# Patient Record
Sex: FEMALE | Race: WHITE | Employment: FULL TIME | URBAN - METROPOLITAN AREA
[De-identification: names, ages, dates, MRNs, and addresses within clinical notes are randomized per-mention and may not be internally consistent; named-entity substitution may affect disease eponyms.]

---

## 2017-04-06 ENCOUNTER — OFFICE VISIT (OUTPATIENT)
Dept: PODIATRY | Facility: CLINIC | Age: 82
End: 2017-04-06
Payer: COMMERCIAL

## 2017-04-06 VITALS — HEIGHT: 62 IN | BODY MASS INDEX: 20.43 KG/M2 | WEIGHT: 111 LBS | HEART RATE: 82 BPM

## 2017-04-06 DIAGNOSIS — B35.1 ONYCHOMYCOSIS: ICD-10-CM

## 2017-04-06 DIAGNOSIS — L85.3 DRY SKIN: ICD-10-CM

## 2017-04-06 DIAGNOSIS — L60.8 NAIL DEFORMITY: Primary | ICD-10-CM

## 2017-04-06 PROCEDURE — 11721 DEBRIDE NAIL 6 OR MORE: CPT | Performed by: PODIATRIST

## 2017-04-06 PROCEDURE — 99213 OFFICE O/P EST LOW 20 MIN: CPT | Mod: 25 | Performed by: PODIATRIST

## 2017-04-06 RX ORDER — AMMONIUM LACTATE 12 G/100G
CREAM TOPICAL 2 TIMES DAILY PRN
Qty: 385 G | Refills: 3 | Status: SHIPPED | OUTPATIENT
Start: 2017-04-06

## 2017-04-06 NOTE — PATIENT INSTRUCTIONS
DR. LUZ'S SCHEDULE:        Monday & Friday AM - [facility] Wednesday - [facility]   [address] [place] [address] [place] [address]   P: [phone] P: [phone]   F: [phone] F:[phone]       Tuesday - Surgery Thursday - [facility]   [phone] [address] [place] [address]   Appointment Scheduling Line: P: [phone] F: [phone]     FYI: Our new schedule at [facility] on Wednesday is from 7 AM - 2 PM.    FOOT CARE NURSES  If you are interested in having a foot care nurse come out to your   home, please call one of these contacts for more information:  [facility]  [phone] [facility]  [phone]   [facility]  [phone]  [facility] [phone]  [place]   [facility]  [phone]  At [facility] [phone]           Body Mass Index (BMI)    Many things can cause foot and ankle problems. Foot structure, activity level, foot mechanics and injuries are common causes of pain.    One very important issue that often goes unmentioned, is body weight.  Extra weight can cause increased stress on muscles, ligaments, bones and tendons. Sometimes just a few extra pounds is all it takes to put one over her/his threshold. Without reducing that stress, it can be difficult to alleviate pain.      Some people are uncomfortable addressing this issue, but we feel it is important for you to think about it. As Foot & Ankle specialists, our job is addressing the lower extremity problem and possible causes.     Regarding extra body weight, we encourage patients to discuss diet and weight management plans with their primary care doctors. It is this team approach that gives you the best opportunity for pain relief and getting you back on your feet.

## 2017-04-06 NOTE — PROGRESS NOTES
"ASSESSMENT/PLAN:    Encounter Diagnoses   Name Primary?     Nail deformity, severe, x 10 Yes     Onychomycosis      Dry skin      Using a sterile nail nippers, debridement of 10 nails was performed.  Nails were shortened.  The thickest nails were cut in a fasion to skive off some of the bulk.  Subungual debris was cleared away where needed.      Lac Hydrin 12% cream prescribed for dry skin     Follow up as needed.    Body mass index is 20.3 kg/(m^2).          Sudarshan Couch DPM, FACFAS, MS    [place] Department of Podiatry/Foot & Ankle Surgery      ____________________________________________________________________    HPI:         Chief Complaint: \"nails growing into skin\"  Onset of problem:  1 year  Pain/ discomfort is described as:  shooting  Ratin/10   Frequency:  \"most of the time\"    The pain is made worse with shoes  Previous treatment: nail debridement      *  Past Medical History:   Diagnosis Date     Hyperlipidemia LDL goal <100 2014     Hypertension goal BP (blood pressure) < 140/90 2014     Personal history of malignant neoplasm of large intestine (COLONIC) 2014   *  *No past surgical history on file.*  *  Current Outpatient Prescriptions   Medication Sig Dispense Refill     hydrochlorothiazide (HYDRODIURIL) 25 MG tablet Take 1 tablet (25 mg) by mouth daily 30 tablet      metoprolol (TOPROL-XL) 25 MG 24 hr tablet Take 1 tablet (25 mg) by mouth daily 30 tablet 1     simvastatin (ZOCOR) 40 MG tablet Take 1 tablet (40 mg) by mouth At Bedtime 90 tablet 3     fluticasone (FLONASE) 50 MCG/ACT nasal spray Spray 2 sprays into both nostrils daily 1 Package 0       ROS:    A 10-point review of systems was performed.  It is positive for that noted in the HPI and as seen below.  All other systems found to be negative.     Numbness in feet?  no   Calf pain with walking? no  Recent foot/ankle injury? No  Weight change  over past 12 months? 30# loss  Self perception as overweight? no  Recent flu-like " symptoms? no  Joint pain other than feet ? Right hip    EXAM:    Vitals: There were no vitals taken for this visit.  BMI: There is no height or weight on file to calculate BMI.  Height: Data Unavailable    Constitutional/ general:  Pt is in no apparent distress, appears well-nourished.  Cooperative with history and physical exam.     Vascular:  Pedal pulses are palpable bilaterally for both the DP and PT arteries.  CFT < 3 sec.  No edema.  Pedal hair growth noted.     Neuro:  Alert and oriented x 3. Coordinated gait.  Light touch sensation is intact to the L4, L5, S1 distributions. No obvious deficits.  No evidence of neurological-based weakness, spasticity, or contracture in the lower extremities.   Protective sensation is intact per [place]-Jg Monofilament testing.    Derm: Normal texture and turgor.  No erythema, ecchymosis, or cyanosis.  No open lesions. Generalized dryness and flaking.     Nails are thickened, deformed, discolored and elongated. There is subungual debris. Several are so long that they are gryphotic and digging into the skin or other toes.    Musculoskeletal:    Lower extremity muscle strength is normal. Patient is ambulatory without an assistive device or brace . Digital contractures. Hallux abducto valgus right foot foot. The deformity is not fully reducible in the transverse plane - there is tracking.       Sudarshan Couch DPM, FACUMM, MS    [place] Department of Podiatry/Foot & Ankle Surgery

## 2017-04-06 NOTE — NURSING NOTE
"Chief Complaint   Patient presents with     RECHECK     nails are thick and growing into the skin       Initial Pulse 82  Ht 5' 2\" (1.575 m)  Wt 111 lb (50.3 kg)  BMI 20.3 kg/m2 Estimated body mass index is 20.3 kg/(m^2) as calculated from the following:    Height as of this encounter: 5' 2\" (1.575 m).    Weight as of this encounter: 111 lb (50.3 kg).  Medication Reconciliation: complete  "

## 2017-04-06 NOTE — LETTER
"  2017       RE: Ana Kumar  [address]  [facility] [address]           Dear Colleague,    Thank you for referring your patient, Ana Kumar, to the [facility]. Please see a copy of my visit note below.    ASSESSMENT/PLAN:    Encounter Diagnoses   Name Primary?     Nail deformity, severe, x 10 Yes     Onychomycosis      Dry skin      Using a sterile nail nippers, debridement of 10 nails was performed.  Nails were shortened.  The thickest nails were cut in a fasion to skive off some of the bulk.  Subungual debris was cleared away where needed.      Lac Hydrin 12% cream prescribed for dry skin     Follow up as needed.    Body mass index is 20.3 kg/(m^2).          Sudarshan Couch DPM, FACFAS, MS    [place] Department of Podiatry/Foot & Ankle Surgery      ____________________________________________________________________    HPI:         Chief Complaint: \"nails growing into skin\"  Onset of problem:  1 year  Pain/ discomfort is described as:  shooting  Ratin/10   Frequency:  \"most of the time\"    The pain is made worse with shoes  Previous treatment: nail debridement      *  Past Medical History:   Diagnosis Date     Hyperlipidemia LDL goal <100 2014     Hypertension goal BP (blood pressure) < 140/90 2014     Personal history of malignant neoplasm of large intestine (COLONIC) 2014   *  *No past surgical history on file.*  *  Current Outpatient Prescriptions   Medication Sig Dispense Refill     hydrochlorothiazide (HYDRODIURIL) 25 MG tablet Take 1 tablet (25 mg) by mouth daily 30 tablet      metoprolol (TOPROL-XL) 25 MG 24 hr tablet Take 1 tablet (25 mg) by mouth daily 30 tablet 1     simvastatin (ZOCOR) 40 MG tablet Take 1 tablet (40 mg) by mouth At Bedtime 90 tablet 3     fluticasone (FLONASE) 50 MCG/ACT nasal spray Spray 2 sprays into both nostrils daily 1 Package 0       ROS:    A 10-point review of systems was performed.  It is positive for that noted in the " HPI and as seen below.  All other systems found to be negative.     Numbness in feet?  no   Calf pain with walking? no  Recent foot/ankle injury? No  Weight change  over past 12 months? 30# loss  Self perception as overweight? no  Recent flu-like symptoms? no  Joint pain other than feet ? Right hip    EXAM:    Vitals: There were no vitals taken for this visit.  BMI: There is no height or weight on file to calculate BMI.  Height: Data Unavailable    Constitutional/ general:  Pt is in no apparent distress, appears well-nourished.  Cooperative with history and physical exam.     Vascular:  Pedal pulses are palpable bilaterally for both the DP and PT arteries.  CFT < 3 sec.  No edema.  Pedal hair growth noted.     Neuro:  Alert and oriented x 3. Coordinated gait.  Light touch sensation is intact to the L4, L5, S1 distributions. No obvious deficits.  No evidence of neurological-based weakness, spasticity, or contracture in the lower extremities.   Protective sensation is intact per [place]-Jg Monofilament testing.    Derm: Normal texture and turgor.  No erythema, ecchymosis, or cyanosis.  No open lesions. Generalized dryness and flaking.     Nails are thickened, deformed, discolored and elongated. There is subungual debris. Several are so long that they are gryphotic and digging into the skin or other toes.    Musculoskeletal:    Lower extremity muscle strength is normal. Patient is ambulatory without an assistive device or brace . Digital contractures. Hallux abducto valgus right foot foot. The deformity is not fully reducible in the transverse plane - there is tracking.       Sudarshan Couch DPM, FACFAS, MS    [place] Department of Podiatry/Foot & Ankle Surgery              Again, thank you for allowing me to participate in the care of your patient.        Sincerely,              Sudarshan Couch DPM

## 2017-04-06 NOTE — MR AVS SNAPSHOT
After Visit Summary   2017    Ana Kumar    MRN: [id number]           Patient Information     Date Of Birth          10/27/1928        Visit Information        Provider Department      2017 10:00 AM Sudarshan Luz DPM [facility]        Care Instructions    DR. LUZ'S SCHEDULE:        Monday & Friday AM - [facility] Wednesday - [facility]   [address] [place] [address] [place] [address]   P: [phone] P: [phone]   F: [phone] F:[phone]       Tuesday - Surgery Thursday - [facility]   [phone] [address] [place] [address]   Appointment Scheduling Line: P: [phone] F: [phone]     FYI: Our new schedule at [facility] on Wednesday is from 7 AM - 2 PM.    FOOT CARE NURSES  If you are interested in having a foot care nurse come out to your   home, please call one of these contacts for more information:  [facility]  [phone] [facility]  [phone]   [facility]  [phone]  [facility] [phone]  [place]   [facility]  [phone]  At [facility] [phone]           Body Mass Index (BMI)    Many things can cause foot and ankle problems. Foot structure, activity level, foot mechanics and injuries are common causes of pain.    One very important issue that often goes unmentioned, is body weight.  Extra weight can cause increased stress on muscles, ligaments, bones and tendons. Sometimes just a few extra pounds is all it takes to put one over her/his threshold. Without reducing that stress, it can be difficult to alleviate pain.      Some people are uncomfortable addressing this issue, but we feel it is important for you to think about it. As Foot & Ankle specialists, our job is addressing the lower extremity problem and possible causes.     Regarding extra body  "weight, we encourage patients to discuss diet and weight management plans with their primary care doctors. It is this team approach that gives you the best opportunity for pain relief and getting you back on your feet.              Follow-ups after your visit        Who to contact     If you have questions or need follow up information about today's clinic visit or your schedule please contact [facility] directly at [phone].  Normal or non-critical lab and imaging results will be communicated to you by [employer], letter or phone within 4 business days after the clinic has received the results. If you do not hear from us within 7 days, please contact the clinic through [employer] or phone. If you have a critical or abnormal lab result, we will notify you by phone as soon as possible.  Submit refill requests through [employer] or call your pharmacy and they will forward the refill request to us. Please allow 3 business days for your refill to be completed.          Additional Information About Your Visit        [employer] Information     [employer] lets you send messages to your doctor, view your test results, renew your prescriptions, schedule appointments and more. To sign up, go to [email or web address] . Click on \"Log in\" on the left side of the screen, which will take you to the Welcome page. Then click on \"Sign up Now\" on the right side of the page.     You will be asked to enter the access code listed below, as well as some personal information. Please follow the directions to create your username and password.     Your access code is: [email or web address]  Expires: 2017 10:11 AM     Your access code will  in 90 days. If you need help or a new code, please call your [facility] clinic or [phone].        Care EveryWhere ID     This is your Care EveryWhere ID. This could be used by other organizations to access your [facility] medical records  [id number]        Your Vitals Were     Pulse Height BMI " "(Body Mass Index)             82 5' 2\" (1.575 m) 20.3 kg/m2          Blood Pressure from Last 3 Encounters:   01/06/16 128/76   01/05/16 130/70   12/28/15 126/80    Weight from Last 3 Encounters:   04/06/17 111 lb (50.3 kg)   01/25/16 111 lb (50.3 kg)   01/06/16 111 lb (50.3 kg)              Today, you had the following     No orders found for display       Primary Care Provider Office Phone # Fax #    Chas Kyle Vo [phone]       [address] [address]        Thank you!     Thank you for choosing [facility]  for your care. Our goal is always to provide you with excellent care. Hearing back from our patients is one way we can continue to improve our services. Please take a few minutes to complete the written survey that you may receive in the mail after your visit with us. Thank you!             Your Updated Medication List - Protect others around you: Learn how to safely use, store and throw away your medicines at www.disposemymeds.org.          This list is accurate as of: 4/6/17 10:11 AM.  Always use your most recent med list.                   Brand Name Dispense Instructions for use    fluticasone 50 MCG/ACT spray    FLONASE    1 Package    Spray 2 sprays into both nostrils daily       hydrochlorothiazide 25 MG tablet    HYDRODIURIL    30 tablet    Take 1 tablet (25 mg) by mouth daily       metoprolol 25 MG 24 hr tablet    TOPROL-XL    30 tablet    Take 1 tablet (25 mg) by mouth daily       simvastatin 40 MG tablet    ZOCOR    90 tablet    Take 1 tablet (40 mg) by mouth At Bedtime         "

## 2018-11-06 ENCOUNTER — ALLIED HEALTH/NURSE VISIT (OUTPATIENT)
Dept: NURSING | Facility: CLINIC | Age: 83
End: 2018-11-06
Payer: COMMERCIAL

## 2018-11-06 DIAGNOSIS — Z23 NEED FOR PROPHYLACTIC VACCINATION AND INOCULATION AGAINST INFLUENZA: Primary | ICD-10-CM

## 2018-11-06 PROCEDURE — 90662 IIV NO PRSV INCREASED AG IM: CPT

## 2018-11-06 PROCEDURE — G0008 ADMIN INFLUENZA VIRUS VAC: HCPCS

## 2018-11-06 NOTE — MR AVS SNAPSHOT
After Visit Summary   11/6/2018    Ana Kumar    MRN: [id number]           Patient Information     Date Of Birth          10/27/1928        Visit Information        Provider Department      11/6/2018 12:45 PM  FLU CLINIC NURSE [facility]        Today's Diagnoses     Need for prophylactic vaccination and inoculation against influenza    -  1       Follow-ups after your visit        Who to contact     If you have questions or need follow up information about today's clinic visit or your schedule please contact [facility] directly at [phone].  Normal or non-critical lab and imaging results will be communicated to you by MyChart, letter or phone within 4 business days after the clinic has received the results. If you do not hear from us within 7 days, please contact the clinic through MyChart or phone. If you have a critical or abnormal lab result, we will notify you by phone as soon as possible.  Submit refill requests through [employer] or call your pharmacy and they will forward the refill request to us. Please allow 3 business days for your refill to be completed.          Additional Information About Your Visit        Care EveryWhere ID     This is your Care EveryWhere ID. This could be used by other organizations to access your [place] medical records  [id number]         Blood Pressure from Last 3 Encounters:   01/06/16 128/76   01/05/16 130/70   12/28/15 126/80    Weight from Last 3 Encounters:   04/06/17 50.3 kg (111 lb)   01/25/16 50.3 kg (111 lb)   01/06/16 50.3 kg (111 lb)              We Performed the Following     FLU VACCINE, INCREASED ANTIGEN, PRESV FREE, AGE 65+ [10112]     Vaccine Administration, Initial [90120]        Primary Care Provider Office Phone # Fax #    Chas Kyle Vo [phone] [address] [place] [address]        Equal Access to Services     CEE ESCALONA AH: Leatha Kapadia,  wapetrda ashoknishant, qaybta kaaudra lewis, gurpreet diggsaaliu ah. So [facility] [phone].    ATENCIÓN: Si kassandra tripp, tiene a borja disposición servicios gratuitos de asistencia lingüística. Marisela al [phone].    We comply with applicable federal civil rights laws and [place] laws. We do not discriminate on the basis of race, color, national origin, age, disability, sex, sexual orientation, or gender identity.            Thank you!     Thank you for choosing [facility]  for your care. Our goal is always to provide you with excellent care. Hearing back from our patients is one way we can continue to improve our services. Please take a few minutes to complete the written survey that you may receive in the mail after your visit with us. Thank you!             Your Updated Medication List - Protect others around you: Learn how to safely use, store and throw away your medicines at www.disposemymeds.org.          This list is accurate as of 11/6/18 12:55 PM.  Always use your most recent med list.                   Brand Name Dispense Instructions for use Diagnosis    ammonium lactate 12 % cream    LAC-HYDRIN    385 g    Apply topically 2 times daily as needed for dry skin    Dry skin       fluticasone 50 MCG/ACT spray    FLONASE    1 Package    Spray 2 sprays into both nostrils daily    Acute pharyngitis       hydrochlorothiazide 25 MG tablet    HYDRODIURIL    30 tablet    Take 1 tablet (25 mg) by mouth daily        metoprolol succinate 25 MG 24 hr tablet    TOPROL-XL    30 tablet    Take 1 tablet (25 mg) by mouth daily    Hypertension goal BP (blood pressure) < 140/90       simvastatin 40 MG tablet    ZOCOR    90 tablet    Take 1 tablet (40 mg) by mouth At Bedtime

## 2018-11-06 NOTE — PROGRESS NOTES

## 2019-01-21 ENCOUNTER — TELEPHONE (OUTPATIENT)
Dept: FAMILY MEDICINE | Facility: CLINIC | Age: 84
End: 2019-01-21

## 2019-01-21 NOTE — TELEPHONE ENCOUNTER
"Pt called clinic asking about meds possibly sent by [facility] Patient said she was seen by [facility] last week.    She wanted to get these meds \"before she dies.\"    I am not able to see meds rx by another company, like [employer]. I directed pt to call the [facility] where she was seen.  Pt agreed with POC.  KISHAN Keith    "

## 2019-03-07 ENCOUNTER — OFFICE VISIT (OUTPATIENT)
Dept: PODIATRY | Facility: CLINIC | Age: 84
End: 2019-03-07
Payer: MEDICARE

## 2019-03-07 VITALS
BODY MASS INDEX: 17.11 KG/M2 | HEIGHT: 62 IN | WEIGHT: 93 LBS | DIASTOLIC BLOOD PRESSURE: 68 MMHG | SYSTOLIC BLOOD PRESSURE: 102 MMHG

## 2019-03-07 DIAGNOSIS — B35.1 ONYCHOMYCOSIS: ICD-10-CM

## 2019-03-07 DIAGNOSIS — R60.0 EDEMA OF BOTH FEET: ICD-10-CM

## 2019-03-07 DIAGNOSIS — M79.674 PAIN IN TOES OF BOTH FEET: Primary | ICD-10-CM

## 2019-03-07 DIAGNOSIS — L60.8 NAIL DEFORMITY: ICD-10-CM

## 2019-03-07 DIAGNOSIS — M79.675 PAIN IN TOES OF BOTH FEET: Primary | ICD-10-CM

## 2019-03-07 PROCEDURE — 11721 DEBRIDE NAIL 6 OR MORE: CPT | Mod: GA | Performed by: PODIATRIST

## 2019-03-07 PROCEDURE — 99212 OFFICE O/P EST SF 10 MIN: CPT | Mod: 25 | Performed by: PODIATRIST

## 2019-03-07 ASSESSMENT — MIFFLIN-ST. JEOR: SCORE: 795.1

## 2019-03-07 NOTE — PROGRESS NOTES
Encounter Diagnoses   Name Primary?     Pain in toes of both feet Yes     Nail deformity      Onychomycosis      Edema of both feet      It was explained that [facility] does not do routine foot care involving nail trimming.  We have discussed this in the past, but I make an exception for her. This is due to her age and the degree of nail deformity.  I do not think that a foot care nurse would be comfortable debriding her nails. T It was explained that signing of an ABN waiver is needed for all Medicare patient's and that out of pocket payment is likely. She had no issues with this.    Using a sterile nail nippers, debridement of 10 nails was performed.  Nails were shortened.  The thickest nails were cut in a fasion to skive off some of the bulk.  Subungual debris was cleared away where needed.      There was bleeding located at distal left hallux. The area was cleansed with an alcohol wipe.  Bacitracin and bandaid applied.      Pt to monitor and call clinic if increasing rendess, pain, and swelling.    I do not think her bilateral foot edema is stemming from the foot. She is to monitor.  If it persists or worsens, I recommended that she follow up with her PCP.    Sudarshan Couch, BRENDEN, FACFAS, MS    [place] Department of Podiatry/Foot & Ankle Surgery      ____________________________________________________________________    HPI:         Chief Complaint: bilateral foot pain related to toenail deformities.   Onset of problem: weeks of pain  Pain/ discomfort is described as:  Deep ache, throbbing  Ratin/10 at worst   Due to the degree of deformity, she is unable to care for her nails.   She also reports noticing more swelling in both of her feet.     Patient Active Problem List   Diagnosis     Hyperlipidemia LDL goal <100     Hypertension goal BP (blood pressure) < 140/90     History of malignant neoplasm of large intestine     Seasonal allergic rhinitis     Hip pain, right      "Abnormal gait     No past surgical history on file.  Current Outpatient Medications   Medication Sig Dispense Refill     ammonium lactate (LAC-HYDRIN) 12 % cream Apply topically 2 times daily as needed for dry skin 385 g 3     fluticasone (FLONASE) 50 MCG/ACT nasal spray Spray 2 sprays into both nostrils daily 1 Package 0     hydrochlorothiazide (HYDRODIURIL) 25 MG tablet Take 1 tablet (25 mg) by mouth daily 30 tablet      metoprolol (TOPROL-XL) 25 MG 24 hr tablet Take 1 tablet (25 mg) by mouth daily 30 tablet 1     simvastatin (ZOCOR) 40 MG tablet Take 1 tablet (40 mg) by mouth At Bedtime 90 tablet 3       EXAM:    Vitals: /68   Ht 1.575 m (5' 2\")   Wt 42.2 kg (93 lb)   BMI 17.01 kg/m    BMI: Body mass index is 17.01 kg/m .  Height: 5' 2\"    Constitutional/ general:  Pt is in no apparent distress, appears well-nourished.  Cooperative with history and physical exam.      Vascular:  Pedal pulses are palpable bilaterally for both the DP and PT arteries.  CFT < 3 sec.  No edema.  Pedal hair growth noted.      Neuro:  Alert and oriented x 3. Coordinated gait.  Light touch sensation is intact to the L4, L5, S1 distributions. No obvious deficits.  No evidence of neurological-based weakness, spasticity, or contracture in the lower extremities.   Protective sensation is intact per [place]-Jg Monofilament testing.     Derm: Normal texture and turgor.  No erythema, ecchymosis, or cyanosis.  No open lesions. Generalized dryness and flaking.      Nails are severely thickened, deformed, discolored and elongated. There is subungual debris. Several are so long that they are gryphotic and digging into the skin or other toes.     Musculoskeletal:    Lower extremity muscle strength is normal. Patient is ambulatory without an assistive device or brace . Digital contractures. Hallux abducto valgus right foot foot. The deformity is not fully reducible in the transverse plane - there is tracking.       Sudarshan Couch, DPM, " [place] [facility] Department of Podiatry/Foot & Ankle Surgery

## 2019-03-07 NOTE — PATIENT INSTRUCTIONS
Thank you for choosing [facility]!    DR. LUZ'S CLINIC LOCATIONS     MONDAY - [place] WEDNESDAY - [place]   [address] [place] [address] [place], [place] [id number] [phone] / [phone] / [phone]       THURSDAY - [place] SCHEDULE SURGERY: [phone]   [address] APPOINTMENTS: [phone]   [place] [address] BILLING QUESTIONS: [phone] / [phone]       FOOT CARE NURSES    [facility]  Светлана Garcia  [phone]  (Travels to your home) [facility]  [phone]  (Travels to your home)   Denis Tobin DPM  [address] [place] [address] [phone] [facility]  [phone]  (Travels to your home)   [facility]  [address], [place], [place] [id number] [phone] [facility]  [phone]  [place] [facility] [place] [facility]   Destiny Marquez, VIVIANM  [address] [place], [place] [address] [phone] [facility]   [phone]  [facility]   [facility]  [phone]  At [facility]  (does not take BCBS) [facility]   [phone]                 FYI: BODY MASS INDEX (BMI)  Many things can cause foot and ankle problems. Foot structure, activity level, foot mechanics and injuries are common causes of pain. One very important issue that often goes unmentioned, is body weight. Extra weight can cause increased stress on muscles, ligaments, bones and tendons. Sometimes just a few extra pounds is all it takes to put one over her/his threshold. Without reducing that stress, it can be difficult to alleviate pain. Some people are uncomfortable addressing this issue, but we feel it is important for you to think about it. As Foot & Ankle specialists, our job is addressing the lower extremity problem and possible causes. Regarding extra body weight, we encourage patients to discuss diet and weight management plans with  their primary care doctors. It is this team approach that gives you the best opportunity for pain relief and getting you back on your feet.

## 2019-03-07 NOTE — LETTER
3/7/2019         RE: Ana Kumar  [address]  [facility] [address]        Dear Colleague,    Thank you for referring your patient, Ana Kumar, to the [facility]. Please see a copy of my visit note below.    Encounter Diagnoses   Name Primary?     Pain in toes of both feet Yes     Nail deformity      Onychomycosis      Edema of both feet      It was explained that [facility] does not do routine foot care involving nail trimming.  We have discussed this in the past, but I make an exception for her. This is due to her age and the degree of nail deformity.  I do not think that a foot care nurse would be comfortable debriding her nails. T It was explained that signing of an ABN waiver is needed for all Medicare patient's and that out of pocket payment is likely. She had no issues with this.    Using a sterile nail nippers, debridement of 10 nails was performed.  Nails were shortened.  The thickest nails were cut in a fasion to skive off some of the bulk.  Subungual debris was cleared away where needed.      There was bleeding located at distal left hallux. The area was cleansed with an alcohol wipe.  Bacitracin and bandaid applied.      Pt to monitor and call clinic if increasing rendess, pain, and swelling.    I do not think her bilateral foot edema is stemming from the foot. She is to monitor.  If it persists or worsens, I recommended that she follow up with her PCP.    Sudarshan Couch, BRENDEN, FACFAS, MS    [facility]      ____________________________________________________________________    HPI:         Chief Complaint: bilateral foot pain related to toenail deformities.   Onset of problem: weeks of pain  Pain/ discomfort is described as:  Deep ache, throbbing  Ratin/10 at worst   Due to the degree of deformity, she is unable to care for her nails.   She also reports noticing more swelling in both of her  "feet.     Patient Active Problem List   Diagnosis     Hyperlipidemia LDL goal <100     Hypertension goal BP (blood pressure) < 140/90     History of malignant neoplasm of large intestine     Seasonal allergic rhinitis     Hip pain, right     Abnormal gait     No past surgical history on file.  Current Outpatient Medications   Medication Sig Dispense Refill     ammonium lactate (LAC-HYDRIN) 12 % cream Apply topically 2 times daily as needed for dry skin 385 g 3     fluticasone (FLONASE) 50 MCG/ACT nasal spray Spray 2 sprays into both nostrils daily 1 Package 0     hydrochlorothiazide (HYDRODIURIL) 25 MG tablet Take 1 tablet (25 mg) by mouth daily 30 tablet      metoprolol (TOPROL-XL) 25 MG 24 hr tablet Take 1 tablet (25 mg) by mouth daily 30 tablet 1     simvastatin (ZOCOR) 40 MG tablet Take 1 tablet (40 mg) by mouth At Bedtime 90 tablet 3       EXAM:    Vitals: /68   Ht 1.575 m (5' 2\")   Wt 42.2 kg (93 lb)   BMI 17.01 kg/m     BMI: Body mass index is 17.01 kg/m .  Height: 5' 2\"    Constitutional/ general:  Pt is in no apparent distress, appears well-nourished.  Cooperative with history and physical exam.      Vascular:  Pedal pulses are palpable bilaterally for both the DP and PT arteries.  CFT < 3 sec.  No edema.  Pedal hair growth noted.      Neuro:  Alert and oriented x 3. Coordinated gait.  Light touch sensation is intact to the L4, L5, S1 distributions. No obvious deficits.  No evidence of neurological-based weakness, spasticity, or contracture in the lower extremities.   Protective sensation is intact per Clovis-Jg Monofilament testing.     Derm: Normal texture and turgor.  No erythema, ecchymosis, or cyanosis.  No open lesions. Generalized dryness and flaking.      Nails are severely thickened, deformed, discolored and elongated. There is subungual debris. Several are so long that they are gryphotic and digging into the skin or other toes.     Musculoskeletal:    Lower extremity muscle " strength is normal. Patient is ambulatory without an assistive device or brace . Digital contractures. Hallux abducto valgus right foot foot. The deformity is not fully reducible in the transverse plane - there is tracking.       Sudarshan Couch DPM, FACUMM, MS    [facility]              Again, thank you for allowing me to participate in the care of your patient.        Sincerely,        Sudarshan Couch DPM

## 2022-07-05 ENCOUNTER — LAB REQUISITION (OUTPATIENT)
Dept: LAB | Facility: CLINIC | Age: 87
End: 2022-07-05
Payer: MEDICARE

## 2022-07-05 DIAGNOSIS — E87.1 HYPO-OSMOLALITY AND HYPONATREMIA: ICD-10-CM

## 2022-07-06 LAB
ANION GAP SERPL CALCULATED.3IONS-SCNC: 12 MMOL/L (ref 7–15)
BUN SERPL-MCNC: 16.4 MG/DL (ref 8–23)
CALCIUM SERPL-MCNC: 9.2 MG/DL (ref 8.2–9.6)
CHLORIDE SERPL-SCNC: 104 MMOL/L (ref 98–107)
CREAT SERPL-MCNC: 0.92 MG/DL (ref 0.51–0.95)
DEPRECATED HCO3 PLAS-SCNC: 26 MMOL/L (ref 22–29)
GFR SERPL CREATININE-BSD FRML MDRD: 58 ML/MIN/1.73M2
GLUCOSE SERPL-MCNC: 117 MG/DL (ref 70–99)
POTASSIUM SERPL-SCNC: 3.1 MMOL/L (ref 3.4–5.3)
SODIUM SERPL-SCNC: 142 MMOL/L (ref 136–145)

## 2022-07-06 PROCEDURE — 36415 COLL VENOUS BLD VENIPUNCTURE: CPT | Mod: ORL | Performed by: FAMILY MEDICINE

## 2022-07-06 PROCEDURE — P9603 ONE-WAY ALLOW PRORATED MILES: HCPCS | Mod: ORL | Performed by: FAMILY MEDICINE

## 2022-07-06 PROCEDURE — 80048 BASIC METABOLIC PNL TOTAL CA: CPT | Mod: ORL | Performed by: FAMILY MEDICINE

## 2022-08-20 ENCOUNTER — LAB REQUISITION (OUTPATIENT)
Dept: LAB | Facility: CLINIC | Age: 87
End: 2022-08-20
Payer: MEDICARE

## 2022-08-20 DIAGNOSIS — E87.6 HYPOKALEMIA: ICD-10-CM

## 2022-08-20 DIAGNOSIS — I10 ESSENTIAL (PRIMARY) HYPERTENSION: ICD-10-CM

## 2022-08-20 DIAGNOSIS — E87.1 HYPO-OSMOLALITY AND HYPONATREMIA: ICD-10-CM

## 2022-08-24 LAB
ANION GAP SERPL CALCULATED.3IONS-SCNC: 11 MMOL/L (ref 7–15)
BUN SERPL-MCNC: 12.3 MG/DL (ref 8–23)
CALCIUM SERPL-MCNC: 9.7 MG/DL (ref 8.2–9.6)
CHLORIDE SERPL-SCNC: 100 MMOL/L (ref 98–107)
CREAT SERPL-MCNC: 0.98 MG/DL (ref 0.51–0.95)
DEPRECATED HCO3 PLAS-SCNC: 27 MMOL/L (ref 22–29)
GFR SERPL CREATININE-BSD FRML MDRD: 54 ML/MIN/1.73M2
GLUCOSE SERPL-MCNC: 79 MG/DL (ref 70–99)
POTASSIUM SERPL-SCNC: 4.4 MMOL/L (ref 3.4–5.3)
SODIUM SERPL-SCNC: 138 MMOL/L (ref 136–145)

## 2022-08-24 PROCEDURE — 80048 BASIC METABOLIC PNL TOTAL CA: CPT | Mod: ORL | Performed by: FAMILY MEDICINE

## 2022-08-24 PROCEDURE — 36415 COLL VENOUS BLD VENIPUNCTURE: CPT | Mod: ORL | Performed by: FAMILY MEDICINE

## 2022-08-24 PROCEDURE — P9603 ONE-WAY ALLOW PRORATED MILES: HCPCS | Mod: ORL | Performed by: FAMILY MEDICINE

## 2022-12-27 ENCOUNTER — LAB REQUISITION (OUTPATIENT)
Dept: LAB | Facility: CLINIC | Age: 87
End: 2022-12-27
Payer: MEDICARE

## 2022-12-27 DIAGNOSIS — I10 ESSENTIAL (PRIMARY) HYPERTENSION: ICD-10-CM

## 2022-12-28 LAB
ANION GAP SERPL CALCULATED.3IONS-SCNC: 20 MMOL/L (ref 7–15)
BUN SERPL-MCNC: 12.2 MG/DL (ref 8–23)
CALCIUM SERPL-MCNC: 9.2 MG/DL (ref 8.2–9.6)
CHLORIDE SERPL-SCNC: 94 MMOL/L (ref 98–107)
CREAT SERPL-MCNC: 0.82 MG/DL (ref 0.51–0.95)
DEPRECATED HCO3 PLAS-SCNC: 20 MMOL/L (ref 22–29)
ERYTHROCYTE [DISTWIDTH] IN BLOOD BY AUTOMATED COUNT: 12.6 % (ref 10–15)
GFR SERPL CREATININE-BSD FRML MDRD: 66 ML/MIN/1.73M2
GLUCOSE SERPL-MCNC: 175 MG/DL (ref 70–99)
HCT VFR BLD AUTO: 36.2 % (ref 35–47)
HGB BLD-MCNC: 11.7 G/DL (ref 11.7–15.7)
MCH RBC QN AUTO: 32.3 PG (ref 26.5–33)
MCHC RBC AUTO-ENTMCNC: 32.3 G/DL (ref 31.5–36.5)
MCV RBC AUTO: 100 FL (ref 78–100)
PLATELET # BLD AUTO: 276 10E3/UL (ref 150–450)
POTASSIUM SERPL-SCNC: 4 MMOL/L (ref 3.4–5.3)
RBC # BLD AUTO: 3.62 10E6/UL (ref 3.8–5.2)
SODIUM SERPL-SCNC: 134 MMOL/L (ref 136–145)
WBC # BLD AUTO: 7.2 10E3/UL (ref 4–11)

## 2022-12-28 PROCEDURE — P9603 ONE-WAY ALLOW PRORATED MILES: HCPCS | Mod: ORL | Performed by: FAMILY MEDICINE

## 2022-12-28 PROCEDURE — 85027 COMPLETE CBC AUTOMATED: CPT | Mod: ORL | Performed by: FAMILY MEDICINE

## 2022-12-28 PROCEDURE — 36415 COLL VENOUS BLD VENIPUNCTURE: CPT | Mod: ORL | Performed by: FAMILY MEDICINE

## 2022-12-28 PROCEDURE — 80048 BASIC METABOLIC PNL TOTAL CA: CPT | Mod: ORL | Performed by: FAMILY MEDICINE

## 2023-07-31 ENCOUNTER — LAB REQUISITION (OUTPATIENT)
Dept: LAB | Facility: CLINIC | Age: 88
End: 2023-07-31
Payer: MEDICARE

## 2023-07-31 DIAGNOSIS — I10 ESSENTIAL (PRIMARY) HYPERTENSION: ICD-10-CM

## 2023-08-02 LAB
ANION GAP SERPL CALCULATED.3IONS-SCNC: 12 MMOL/L (ref 7–15)
BASOPHILS # BLD AUTO: 0 10E3/UL (ref 0–0.2)
BASOPHILS NFR BLD AUTO: 0 %
BUN SERPL-MCNC: 20.7 MG/DL (ref 8–23)
CALCIUM SERPL-MCNC: 9.2 MG/DL (ref 8.2–9.6)
CHLORIDE SERPL-SCNC: 101 MMOL/L (ref 98–107)
CREAT SERPL-MCNC: 0.81 MG/DL (ref 0.51–0.95)
DEPRECATED HCO3 PLAS-SCNC: 26 MMOL/L (ref 22–29)
EOSINOPHIL # BLD AUTO: 0.1 10E3/UL (ref 0–0.7)
EOSINOPHIL NFR BLD AUTO: 2 %
ERYTHROCYTE [DISTWIDTH] IN BLOOD BY AUTOMATED COUNT: 12.7 % (ref 10–15)
GFR SERPL CREATININE-BSD FRML MDRD: 67 ML/MIN/1.73M2
GLUCOSE SERPL-MCNC: 162 MG/DL (ref 70–99)
HCT VFR BLD AUTO: 36.5 % (ref 35–47)
HGB BLD-MCNC: 11.9 G/DL (ref 11.7–15.7)
IMM GRANULOCYTES # BLD: 0 10E3/UL
IMM GRANULOCYTES NFR BLD: 0 %
LYMPHOCYTES # BLD AUTO: 1 10E3/UL (ref 0.8–5.3)
LYMPHOCYTES NFR BLD AUTO: 17 %
MCH RBC QN AUTO: 33.4 PG (ref 26.5–33)
MCHC RBC AUTO-ENTMCNC: 32.6 G/DL (ref 31.5–36.5)
MCV RBC AUTO: 103 FL (ref 78–100)
MONOCYTES # BLD AUTO: 0.4 10E3/UL (ref 0–1.3)
MONOCYTES NFR BLD AUTO: 7 %
NEUTROPHILS # BLD AUTO: 4.4 10E3/UL (ref 1.6–8.3)
NEUTROPHILS NFR BLD AUTO: 74 %
NRBC # BLD AUTO: 0 10E3/UL
NRBC BLD AUTO-RTO: 0 /100
PLATELET # BLD AUTO: 226 10E3/UL (ref 150–450)
POTASSIUM SERPL-SCNC: 3.8 MMOL/L (ref 3.4–5.3)
RBC # BLD AUTO: 3.56 10E6/UL (ref 3.8–5.2)
SODIUM SERPL-SCNC: 139 MMOL/L (ref 136–145)
WBC # BLD AUTO: 5.9 10E3/UL (ref 4–11)

## 2023-08-02 PROCEDURE — 85025 COMPLETE CBC W/AUTO DIFF WBC: CPT | Mod: ORL | Performed by: PHYSICIAN ASSISTANT

## 2023-08-02 PROCEDURE — P9603 ONE-WAY ALLOW PRORATED MILES: HCPCS | Mod: ORL | Performed by: PHYSICIAN ASSISTANT

## 2023-08-02 PROCEDURE — 80048 BASIC METABOLIC PNL TOTAL CA: CPT | Mod: ORL | Performed by: PHYSICIAN ASSISTANT

## 2023-08-02 PROCEDURE — 36415 COLL VENOUS BLD VENIPUNCTURE: CPT | Mod: ORL | Performed by: PHYSICIAN ASSISTANT

## 2024-04-30 ENCOUNTER — LAB REQUISITION (OUTPATIENT)
Dept: LAB | Facility: CLINIC | Age: 89
End: 2024-04-30
Payer: MEDICARE

## 2024-04-30 DIAGNOSIS — I73.9 PERIPHERAL VASCULAR DISEASE, UNSPECIFIED (H): ICD-10-CM

## 2024-05-01 LAB
ANION GAP SERPL CALCULATED.3IONS-SCNC: 9 MMOL/L (ref 7–15)
BASOPHILS # BLD AUTO: 0 10E3/UL (ref 0–0.2)
BASOPHILS NFR BLD AUTO: 0 %
BUN SERPL-MCNC: 21.6 MG/DL (ref 8–23)
CALCIUM SERPL-MCNC: 9 MG/DL (ref 8.2–9.6)
CHLORIDE SERPL-SCNC: 100 MMOL/L (ref 98–107)
CREAT SERPL-MCNC: 0.78 MG/DL (ref 0.51–0.95)
DEPRECATED HCO3 PLAS-SCNC: 28 MMOL/L (ref 22–29)
EGFRCR SERPLBLD CKD-EPI 2021: 70 ML/MIN/1.73M2
EOSINOPHIL # BLD AUTO: 0.1 10E3/UL (ref 0–0.7)
EOSINOPHIL NFR BLD AUTO: 2 %
ERYTHROCYTE [DISTWIDTH] IN BLOOD BY AUTOMATED COUNT: 12.6 % (ref 10–15)
GLUCOSE SERPL-MCNC: 119 MG/DL (ref 70–99)
HCT VFR BLD AUTO: 35.7 % (ref 35–47)
HGB BLD-MCNC: 11.9 G/DL (ref 11.7–15.7)
IMM GRANULOCYTES # BLD: 0 10E3/UL
IMM GRANULOCYTES NFR BLD: 0 %
LYMPHOCYTES # BLD AUTO: 1.2 10E3/UL (ref 0.8–5.3)
LYMPHOCYTES NFR BLD AUTO: 21 %
MCH RBC QN AUTO: 33.9 PG (ref 26.5–33)
MCHC RBC AUTO-ENTMCNC: 33.3 G/DL (ref 31.5–36.5)
MCV RBC AUTO: 102 FL (ref 78–100)
MONOCYTES # BLD AUTO: 0.5 10E3/UL (ref 0–1.3)
MONOCYTES NFR BLD AUTO: 8 %
NEUTROPHILS # BLD AUTO: 3.9 10E3/UL (ref 1.6–8.3)
NEUTROPHILS NFR BLD AUTO: 69 %
NRBC # BLD AUTO: 0 10E3/UL
NRBC BLD AUTO-RTO: 0 /100
PLATELET # BLD AUTO: 220 10E3/UL (ref 150–450)
POTASSIUM SERPL-SCNC: 4 MMOL/L (ref 3.4–5.3)
RBC # BLD AUTO: 3.51 10E6/UL (ref 3.8–5.2)
SODIUM SERPL-SCNC: 137 MMOL/L (ref 135–145)
WBC # BLD AUTO: 5.7 10E3/UL (ref 4–11)

## 2024-05-01 PROCEDURE — 85025 COMPLETE CBC W/AUTO DIFF WBC: CPT | Mod: ORL | Performed by: FAMILY MEDICINE

## 2024-05-01 PROCEDURE — P9604 ONE-WAY ALLOW PRORATED TRIP: HCPCS | Mod: ORL | Performed by: FAMILY MEDICINE

## 2024-05-01 PROCEDURE — 36415 COLL VENOUS BLD VENIPUNCTURE: CPT | Mod: ORL | Performed by: FAMILY MEDICINE

## 2024-05-01 PROCEDURE — 80048 BASIC METABOLIC PNL TOTAL CA: CPT | Mod: ORL | Performed by: FAMILY MEDICINE

## 2024-11-28 ENCOUNTER — HOSPITAL ENCOUNTER (INPATIENT)
Facility: HOSPITAL | Age: 89
End: 2024-11-28
Attending: EMERGENCY MEDICINE | Admitting: STUDENT IN AN ORGANIZED HEALTH CARE EDUCATION/TRAINING PROGRAM
Payer: MEDICARE

## 2024-11-28 ENCOUNTER — APPOINTMENT (OUTPATIENT)
Dept: CT IMAGING | Facility: HOSPITAL | Age: 89
End: 2024-11-28
Attending: EMERGENCY MEDICINE
Payer: MEDICARE

## 2024-11-28 VITALS
HEART RATE: 87 BPM | RESPIRATION RATE: 24 BRPM | OXYGEN SATURATION: 94 % | HEIGHT: 62 IN | DIASTOLIC BLOOD PRESSURE: 75 MMHG | SYSTOLIC BLOOD PRESSURE: 147 MMHG | TEMPERATURE: 99.4 F | BODY MASS INDEX: 17.11 KG/M2 | WEIGHT: 93 LBS

## 2024-11-28 DIAGNOSIS — F03.C0 SEVERE DEMENTIA WITHOUT BEHAVIORAL DISTURBANCE, PSYCHOTIC DISTURBANCE, MOOD DISTURBANCE, OR ANXIETY, UNSPECIFIED DEMENTIA TYPE (H): Primary | ICD-10-CM

## 2024-11-28 DIAGNOSIS — E83.42 HYPOMAGNESEMIA: ICD-10-CM

## 2024-11-28 DIAGNOSIS — R41.0 CONFUSION ASSOCIATED WITH INFECTION: ICD-10-CM

## 2024-11-28 DIAGNOSIS — J01.00 SUBACUTE MAXILLARY SINUSITIS: ICD-10-CM

## 2024-11-28 DIAGNOSIS — B99.9 CONFUSION ASSOCIATED WITH INFECTION: ICD-10-CM

## 2024-11-28 LAB
ALBUMIN SERPL BCG-MCNC: 3.3 G/DL (ref 3.5–5.2)
ALP SERPL-CCNC: 69 U/L (ref 40–150)
ALT SERPL W P-5'-P-CCNC: 9 U/L (ref 0–50)
ANION GAP SERPL CALCULATED.3IONS-SCNC: 14 MMOL/L (ref 7–15)
AST SERPL W P-5'-P-CCNC: 25 U/L (ref 0–45)
BILIRUB SERPL-MCNC: 0.2 MG/DL
BUN SERPL-MCNC: 21 MG/DL (ref 8–23)
CALCIUM SERPL-MCNC: 8.6 MG/DL (ref 8.8–10.4)
CHLORIDE SERPL-SCNC: 103 MMOL/L (ref 98–107)
CLUE CELLS: ABNORMAL
CREAT SERPL-MCNC: 0.98 MG/DL (ref 0.51–0.95)
EGFRCR SERPLBLD CKD-EPI 2021: 53 ML/MIN/1.73M2
ERYTHROCYTE [DISTWIDTH] IN BLOOD BY AUTOMATED COUNT: 12.4 % (ref 10–15)
GLUCOSE SERPL-MCNC: 100 MG/DL (ref 70–99)
HCO3 SERPL-SCNC: 25 MMOL/L (ref 22–29)
HCT VFR BLD AUTO: 34.3 % (ref 35–47)
HGB BLD-MCNC: 11.6 G/DL (ref 11.7–15.7)
LACTATE SERPL-SCNC: 0.7 MMOL/L (ref 0.7–2)
MAGNESIUM SERPL-MCNC: 1.5 MG/DL (ref 1.7–2.3)
MCH RBC QN AUTO: 32.4 PG (ref 26.5–33)
MCHC RBC AUTO-ENTMCNC: 33.8 G/DL (ref 31.5–36.5)
MCV RBC AUTO: 96 FL (ref 78–100)
PLATELET # BLD AUTO: 213 10E3/UL (ref 150–450)
POTASSIUM SERPL-SCNC: 3.8 MMOL/L (ref 3.4–5.3)
PROCALCITONIN SERPL IA-MCNC: 0.11 NG/ML
PROT SERPL-MCNC: 6 G/DL (ref 6.4–8.3)
RBC # BLD AUTO: 3.58 10E6/UL (ref 3.8–5.2)
SODIUM SERPL-SCNC: 142 MMOL/L (ref 135–145)
TRICHOMONAS, WET PREP: ABNORMAL
TROPONIN T SERPL HS-MCNC: 29 NG/L
TROPONIN T SERPL HS-MCNC: 30 NG/L
WBC # BLD AUTO: 5.4 10E3/UL (ref 4–11)
WBC'S/HIGH POWER FIELD, WET PREP: ABNORMAL
YEAST, WET PREP: ABNORMAL

## 2024-11-28 PROCEDURE — 85041 AUTOMATED RBC COUNT: CPT | Performed by: EMERGENCY MEDICINE

## 2024-11-28 PROCEDURE — 120N000001 HC R&B MED SURG/OB

## 2024-11-28 PROCEDURE — 80053 COMPREHEN METABOLIC PANEL: CPT | Performed by: EMERGENCY MEDICINE

## 2024-11-28 PROCEDURE — 70450 CT HEAD/BRAIN W/O DYE: CPT | Mod: MG

## 2024-11-28 PROCEDURE — 84484 ASSAY OF TROPONIN QUANT: CPT | Performed by: EMERGENCY MEDICINE

## 2024-11-28 PROCEDURE — G1010 CDSM STANSON: HCPCS

## 2024-11-28 PROCEDURE — 258N000003 HC RX IP 258 OP 636: Performed by: STUDENT IN AN ORGANIZED HEALTH CARE EDUCATION/TRAINING PROGRAM

## 2024-11-28 PROCEDURE — 83605 ASSAY OF LACTIC ACID: CPT | Performed by: EMERGENCY MEDICINE

## 2024-11-28 PROCEDURE — 36415 COLL VENOUS BLD VENIPUNCTURE: CPT | Performed by: EMERGENCY MEDICINE

## 2024-11-28 PROCEDURE — 84145 PROCALCITONIN (PCT): CPT | Performed by: EMERGENCY MEDICINE

## 2024-11-28 PROCEDURE — 87637 SARSCOV2&INF A&B&RSV AMP PRB: CPT | Performed by: STUDENT IN AN ORGANIZED HEALTH CARE EDUCATION/TRAINING PROGRAM

## 2024-11-28 PROCEDURE — 87640 STAPH A DNA AMP PROBE: CPT | Performed by: STUDENT IN AN ORGANIZED HEALTH CARE EDUCATION/TRAINING PROGRAM

## 2024-11-28 PROCEDURE — 87641 MR-STAPH DNA AMP PROBE: CPT | Performed by: STUDENT IN AN ORGANIZED HEALTH CARE EDUCATION/TRAINING PROGRAM

## 2024-11-28 PROCEDURE — 99285 EMERGENCY DEPT VISIT HI MDM: CPT | Mod: 25

## 2024-11-28 PROCEDURE — 93005 ELECTROCARDIOGRAM TRACING: CPT | Performed by: EMERGENCY MEDICINE

## 2024-11-28 PROCEDURE — 87210 SMEAR WET MOUNT SALINE/INK: CPT | Performed by: EMERGENCY MEDICINE

## 2024-11-28 PROCEDURE — 250N000011 HC RX IP 250 OP 636: Performed by: EMERGENCY MEDICINE

## 2024-11-28 PROCEDURE — 85018 HEMOGLOBIN: CPT | Performed by: EMERGENCY MEDICINE

## 2024-11-28 PROCEDURE — 83735 ASSAY OF MAGNESIUM: CPT | Performed by: EMERGENCY MEDICINE

## 2024-11-28 RX ORDER — AMOXICILLIN 250 MG
2 CAPSULE ORAL 2 TIMES DAILY PRN
Status: DISCONTINUED | OUTPATIENT
Start: 2024-11-28 | End: 2024-12-05 | Stop reason: HOSPADM

## 2024-11-28 RX ORDER — AMOXICILLIN 250 MG
2 CAPSULE ORAL 2 TIMES DAILY
Status: DISCONTINUED | OUTPATIENT
Start: 2024-11-29 | End: 2024-12-05 | Stop reason: HOSPADM

## 2024-11-28 RX ORDER — NITROGLYCERIN 0.4 MG/1
0.4 TABLET SUBLINGUAL EVERY 5 MIN PRN
COMMUNITY

## 2024-11-28 RX ORDER — ZINC OXIDE 13 %
CREAM (GRAM) TOPICAL 2 TIMES DAILY
COMMUNITY

## 2024-11-28 RX ORDER — ENOXAPARIN SODIUM 100 MG/ML
30 INJECTION SUBCUTANEOUS EVERY 24 HOURS
Status: DISCONTINUED | OUTPATIENT
Start: 2024-11-29 | End: 2024-12-04

## 2024-11-28 RX ORDER — PROCHLORPERAZINE MALEATE 5 MG/1
5 TABLET ORAL EVERY 6 HOURS PRN
Status: DISCONTINUED | OUTPATIENT
Start: 2024-11-28 | End: 2024-12-05 | Stop reason: HOSPADM

## 2024-11-28 RX ORDER — ACETAMINOPHEN 500 MG
1000 TABLET ORAL 3 TIMES DAILY
Status: DISCONTINUED | OUTPATIENT
Start: 2024-11-29 | End: 2024-12-05 | Stop reason: HOSPADM

## 2024-11-28 RX ORDER — POTASSIUM CHLORIDE 750 MG/1
10 TABLET, EXTENDED RELEASE ORAL DAILY
COMMUNITY
Start: 2024-11-27

## 2024-11-28 RX ORDER — POTASSIUM CHLORIDE 750 MG/1
10 TABLET, EXTENDED RELEASE ORAL DAILY
Status: DISCONTINUED | OUTPATIENT
Start: 2024-11-29 | End: 2024-12-05 | Stop reason: HOSPADM

## 2024-11-28 RX ORDER — CALCIUM CARBONATE 500 MG/1
1500 TABLET, CHEWABLE ORAL DAILY
Status: DISCONTINUED | OUTPATIENT
Start: 2024-11-29 | End: 2024-12-05 | Stop reason: HOSPADM

## 2024-11-28 RX ORDER — ONDANSETRON 4 MG/1
4 TABLET, ORALLY DISINTEGRATING ORAL EVERY 6 HOURS PRN
Status: DISCONTINUED | OUTPATIENT
Start: 2024-11-28 | End: 2024-12-05 | Stop reason: HOSPADM

## 2024-11-28 RX ORDER — LIDOCAINE 40 MG/G
CREAM TOPICAL
Status: DISCONTINUED | OUTPATIENT
Start: 2024-11-28 | End: 2024-12-05 | Stop reason: HOSPADM

## 2024-11-28 RX ORDER — LIDOCAINE 4 G/G
1 PATCH TOPICAL DAILY PRN
COMMUNITY

## 2024-11-28 RX ORDER — MIRTAZAPINE 7.5 MG/1
7.5 TABLET, FILM COATED ORAL AT BEDTIME
COMMUNITY
Start: 2024-11-27

## 2024-11-28 RX ORDER — ONDANSETRON 2 MG/ML
4 INJECTION INTRAMUSCULAR; INTRAVENOUS EVERY 6 HOURS PRN
Status: DISCONTINUED | OUTPATIENT
Start: 2024-11-28 | End: 2024-12-05 | Stop reason: HOSPADM

## 2024-11-28 RX ORDER — ACETAMINOPHEN 500 MG
1000 TABLET ORAL 3 TIMES DAILY
COMMUNITY

## 2024-11-28 RX ORDER — CHOLECALCIFEROL (VITAMIN D3) 50 MCG
1 TABLET ORAL DAILY
COMMUNITY

## 2024-11-28 RX ORDER — LORAZEPAM 0.5 MG/1
0.5 TABLET ORAL DAILY PRN
COMMUNITY
Start: 2024-06-25

## 2024-11-28 RX ORDER — POLYETHYLENE GLYCOL 3350 17 G/17G
17 POWDER, FOR SOLUTION ORAL 2 TIMES DAILY PRN
Status: DISCONTINUED | OUTPATIENT
Start: 2024-11-28 | End: 2024-12-05 | Stop reason: HOSPADM

## 2024-11-28 RX ORDER — LORAZEPAM 0.5 MG/1
0.5 TABLET ORAL
Status: DISCONTINUED | OUTPATIENT
Start: 2024-11-28 | End: 2024-12-05 | Stop reason: HOSPADM

## 2024-11-28 RX ORDER — CALCIUM CARBONATE 500 MG/1
3 TABLET, CHEWABLE ORAL DAILY
COMMUNITY

## 2024-11-28 RX ORDER — PIPERACILLIN SODIUM, TAZOBACTAM SODIUM 3; .375 G/15ML; G/15ML
3.38 INJECTION, POWDER, LYOPHILIZED, FOR SOLUTION INTRAVENOUS ONCE
Status: COMPLETED | OUTPATIENT
Start: 2024-11-28 | End: 2024-11-28

## 2024-11-28 RX ORDER — MAGNESIUM SULFATE HEPTAHYDRATE 40 MG/ML
2 INJECTION, SOLUTION INTRAVENOUS ONCE
Status: COMPLETED | OUTPATIENT
Start: 2024-11-28 | End: 2024-11-29

## 2024-11-28 RX ORDER — AMOXICILLIN 250 MG
1 CAPSULE ORAL 2 TIMES DAILY PRN
Status: DISCONTINUED | OUTPATIENT
Start: 2024-11-28 | End: 2024-12-05 | Stop reason: HOSPADM

## 2024-11-28 RX ORDER — MIRTAZAPINE 7.5 MG/1
7.5 TABLET, FILM COATED ORAL AT BEDTIME
Status: DISCONTINUED | OUTPATIENT
Start: 2024-11-29 | End: 2024-12-05 | Stop reason: HOSPADM

## 2024-11-28 RX ORDER — LANOLIN ALCOHOL/MO/W.PET/CERES
CREAM (GRAM) TOPICAL DAILY
COMMUNITY

## 2024-11-28 RX ORDER — AMOXICILLIN 250 MG
2 CAPSULE ORAL 2 TIMES DAILY
COMMUNITY

## 2024-11-28 RX ORDER — PIPERACILLIN SODIUM, TAZOBACTAM SODIUM 3; .375 G/15ML; G/15ML
3.38 INJECTION, POWDER, LYOPHILIZED, FOR SOLUTION INTRAVENOUS EVERY 8 HOURS
Status: DISCONTINUED | OUTPATIENT
Start: 2024-11-29 | End: 2024-12-01

## 2024-11-28 RX ADMIN — PIPERACILLIN AND TAZOBACTAM 3.38 G: 3; .375 INJECTION, POWDER, FOR SOLUTION INTRAVENOUS at 22:36

## 2024-11-28 RX ADMIN — MAGNESIUM SULFATE HEPTAHYDRATE 2 G: 40 INJECTION, SOLUTION INTRAVENOUS at 23:17

## 2024-11-28 RX ADMIN — SODIUM CHLORIDE 1000 ML: 9 INJECTION, SOLUTION INTRAVENOUS at 22:59

## 2024-11-28 ASSESSMENT — COLUMBIA-SUICIDE SEVERITY RATING SCALE - C-SSRS
1. IN THE PAST MONTH, HAVE YOU WISHED YOU WERE DEAD OR WISHED YOU COULD GO TO SLEEP AND NOT WAKE UP?: NO
2. HAVE YOU ACTUALLY HAD ANY THOUGHTS OF KILLING YOURSELF IN THE PAST MONTH?: NO
6. HAVE YOU EVER DONE ANYTHING, STARTED TO DO ANYTHING, OR PREPARED TO DO ANYTHING TO END YOUR LIFE?: NO

## 2024-11-28 ASSESSMENT — ACTIVITIES OF DAILY LIVING (ADL)
ADLS_ACUITY_SCORE: 41

## 2024-11-29 LAB
ALBUMIN UR-MCNC: 20 MG/DL
ANION GAP SERPL CALCULATED.3IONS-SCNC: 12 MMOL/L (ref 7–15)
APPEARANCE UR: CLEAR
BACTERIA #/AREA URNS HPF: ABNORMAL /HPF
BILIRUB UR QL STRIP: NEGATIVE
BUN SERPL-MCNC: 15.5 MG/DL (ref 8–23)
CALCIUM SERPL-MCNC: 7.6 MG/DL (ref 8.8–10.4)
CHLORIDE SERPL-SCNC: 107 MMOL/L (ref 98–107)
COLOR UR AUTO: ABNORMAL
CREAT SERPL-MCNC: 0.69 MG/DL (ref 0.51–0.95)
EGFRCR SERPLBLD CKD-EPI 2021: 79 ML/MIN/1.73M2
ERYTHROCYTE [DISTWIDTH] IN BLOOD BY AUTOMATED COUNT: 12.4 % (ref 10–15)
FLUAV RNA SPEC QL NAA+PROBE: NEGATIVE
FLUBV RNA RESP QL NAA+PROBE: NEGATIVE
GLUCOSE SERPL-MCNC: 94 MG/DL (ref 70–99)
GLUCOSE UR STRIP-MCNC: NEGATIVE MG/DL
HCO3 SERPL-SCNC: 24 MMOL/L (ref 22–29)
HCT VFR BLD AUTO: 34.8 % (ref 35–47)
HGB BLD-MCNC: 11.4 G/DL (ref 11.7–15.7)
HGB UR QL STRIP: ABNORMAL
KETONES UR STRIP-MCNC: 40 MG/DL
LEUKOCYTE ESTERASE UR QL STRIP: ABNORMAL
MAGNESIUM SERPL-MCNC: 2 MG/DL (ref 1.7–2.3)
MCH RBC QN AUTO: 32 PG (ref 26.5–33)
MCHC RBC AUTO-ENTMCNC: 32.8 G/DL (ref 31.5–36.5)
MCV RBC AUTO: 98 FL (ref 78–100)
MRSA DNA SPEC QL NAA+PROBE: NEGATIVE
NITRATE UR QL: POSITIVE
PH UR STRIP: 5.5 [PH] (ref 5–7)
PHOSPHATE SERPL-MCNC: 2.7 MG/DL (ref 2.5–4.5)
PLATELET # BLD AUTO: 188 10E3/UL (ref 150–450)
POTASSIUM SERPL-SCNC: 3.6 MMOL/L (ref 3.4–5.3)
RBC # BLD AUTO: 3.56 10E6/UL (ref 3.8–5.2)
RBC URINE: 4 /HPF
RSV RNA SPEC NAA+PROBE: NEGATIVE
SA TARGET DNA: POSITIVE
SARS-COV-2 RNA RESP QL NAA+PROBE: POSITIVE
SODIUM SERPL-SCNC: 143 MMOL/L (ref 135–145)
SP GR UR STRIP: 1.02 (ref 1–1.03)
SQUAMOUS EPITHELIAL: 1 /HPF
UROBILINOGEN UR STRIP-MCNC: <2 MG/DL
WBC # BLD AUTO: 5.3 10E3/UL (ref 4–11)
WBC URINE: 29 /HPF

## 2024-11-29 PROCEDURE — 81003 URINALYSIS AUTO W/O SCOPE: CPT | Performed by: EMERGENCY MEDICINE

## 2024-11-29 PROCEDURE — 36415 COLL VENOUS BLD VENIPUNCTURE: CPT | Performed by: STUDENT IN AN ORGANIZED HEALTH CARE EDUCATION/TRAINING PROGRAM

## 2024-11-29 PROCEDURE — 99207 PR NO BILLABLE SERVICE THIS VISIT: CPT | Performed by: STUDENT IN AN ORGANIZED HEALTH CARE EDUCATION/TRAINING PROGRAM

## 2024-11-29 PROCEDURE — 85018 HEMOGLOBIN: CPT | Performed by: STUDENT IN AN ORGANIZED HEALTH CARE EDUCATION/TRAINING PROGRAM

## 2024-11-29 PROCEDURE — 120N000001 HC R&B MED SURG/OB

## 2024-11-29 PROCEDURE — 250N000013 HC RX MED GY IP 250 OP 250 PS 637: Performed by: STUDENT IN AN ORGANIZED HEALTH CARE EDUCATION/TRAINING PROGRAM

## 2024-11-29 PROCEDURE — 87088 URINE BACTERIA CULTURE: CPT | Performed by: EMERGENCY MEDICINE

## 2024-11-29 PROCEDURE — 87186 SC STD MICRODIL/AGAR DIL: CPT | Performed by: EMERGENCY MEDICINE

## 2024-11-29 PROCEDURE — 83735 ASSAY OF MAGNESIUM: CPT | Performed by: STUDENT IN AN ORGANIZED HEALTH CARE EDUCATION/TRAINING PROGRAM

## 2024-11-29 PROCEDURE — 258N000003 HC RX IP 258 OP 636: Performed by: STUDENT IN AN ORGANIZED HEALTH CARE EDUCATION/TRAINING PROGRAM

## 2024-11-29 PROCEDURE — 250N000011 HC RX IP 250 OP 636: Performed by: STUDENT IN AN ORGANIZED HEALTH CARE EDUCATION/TRAINING PROGRAM

## 2024-11-29 PROCEDURE — 84100 ASSAY OF PHOSPHORUS: CPT | Performed by: STUDENT IN AN ORGANIZED HEALTH CARE EDUCATION/TRAINING PROGRAM

## 2024-11-29 PROCEDURE — XW033E5 INTRODUCTION OF REMDESIVIR ANTI-INFECTIVE INTO PERIPHERAL VEIN, PERCUTANEOUS APPROACH, NEW TECHNOLOGY GROUP 5: ICD-10-PCS | Performed by: STUDENT IN AN ORGANIZED HEALTH CARE EDUCATION/TRAINING PROGRAM

## 2024-11-29 PROCEDURE — 99418 PROLNG IP/OBS E/M EA 15 MIN: CPT | Performed by: STUDENT IN AN ORGANIZED HEALTH CARE EDUCATION/TRAINING PROGRAM

## 2024-11-29 PROCEDURE — 80048 BASIC METABOLIC PNL TOTAL CA: CPT | Performed by: STUDENT IN AN ORGANIZED HEALTH CARE EDUCATION/TRAINING PROGRAM

## 2024-11-29 PROCEDURE — 85041 AUTOMATED RBC COUNT: CPT | Performed by: STUDENT IN AN ORGANIZED HEALTH CARE EDUCATION/TRAINING PROGRAM

## 2024-11-29 PROCEDURE — 99223 1ST HOSP IP/OBS HIGH 75: CPT | Mod: AI | Performed by: STUDENT IN AN ORGANIZED HEALTH CARE EDUCATION/TRAINING PROGRAM

## 2024-11-29 PROCEDURE — 84295 ASSAY OF SERUM SODIUM: CPT | Performed by: STUDENT IN AN ORGANIZED HEALTH CARE EDUCATION/TRAINING PROGRAM

## 2024-11-29 RX ORDER — ACETAMINOPHEN 650 MG/1
650 SUPPOSITORY RECTAL DAILY PRN
Status: DISCONTINUED | OUTPATIENT
Start: 2024-11-29 | End: 2024-12-05 | Stop reason: HOSPADM

## 2024-11-29 RX ADMIN — PIPERACILLIN AND TAZOBACTAM 3.38 G: 3; .375 INJECTION, POWDER, FOR SOLUTION INTRAVENOUS at 04:12

## 2024-11-29 RX ADMIN — SODIUM CHLORIDE 50 ML: 9 INJECTION, SOLUTION INTRAVENOUS at 03:36

## 2024-11-29 RX ADMIN — ACETAMINOPHEN 650 MG: 650 SUPPOSITORY RECTAL at 11:19

## 2024-11-29 RX ADMIN — PIPERACILLIN AND TAZOBACTAM 3.38 G: 3; .375 INJECTION, POWDER, FOR SOLUTION INTRAVENOUS at 13:46

## 2024-11-29 RX ADMIN — PIPERACILLIN AND TAZOBACTAM 3.38 G: 3; .375 INJECTION, POWDER, FOR SOLUTION INTRAVENOUS at 20:59

## 2024-11-29 RX ADMIN — ENOXAPARIN SODIUM 30 MG: 30 INJECTION SUBCUTANEOUS at 09:06

## 2024-11-29 RX ADMIN — REMDESIVIR 200 MG: 100 INJECTION, POWDER, LYOPHILIZED, FOR SOLUTION INTRAVENOUS at 02:36

## 2024-11-29 ASSESSMENT — ACTIVITIES OF DAILY LIVING (ADL)
ADLS_ACUITY_SCORE: 41
ADLS_ACUITY_SCORE: 41
ADLS_ACUITY_SCORE: 47
ADLS_ACUITY_SCORE: 41
ADLS_ACUITY_SCORE: 53
ADLS_ACUITY_SCORE: 41
ADLS_ACUITY_SCORE: 47
ADLS_ACUITY_SCORE: 41
DEPENDENT_IADLS:: CLEANING;COOKING;LAUNDRY;SHOPPING;MEAL PREPARATION;MONEY MANAGEMENT;TRANSPORTATION;INCONTINENCE;MEDICATION MANAGEMENT
ADLS_ACUITY_SCORE: 41
ADLS_ACUITY_SCORE: 47
ADLS_ACUITY_SCORE: 53
ADLS_ACUITY_SCORE: 41
ADLS_ACUITY_SCORE: 47
ADLS_ACUITY_SCORE: 41
ADLS_ACUITY_SCORE: 47
ADLS_ACUITY_SCORE: 41
ADLS_ACUITY_SCORE: 47
ADLS_ACUITY_SCORE: 47

## 2024-11-29 NOTE — ED TRIAGE NOTES
Patient brought from [facility] where staff called due to some drooling and increased weakness.     135/71 90hr     DNR     Alert  to self      Triage Assessment (Adult)       Row Name 11/28/24 1956          Triage Assessment    Airway WDL WDL        Respiratory WDL    Respiratory WDL WDL        Skin Circulation/Temperature WDL    Skin Circulation/Temperature WDL WDL        Cardiac WDL    Cardiac WDL WDL        Peripheral/Neurovascular WDL    Peripheral Neurovascular WDL WDL        Cognitive/Neuro/Behavioral WDL    Cognitive/Neuro/Behavioral WDL WDL

## 2024-11-29 NOTE — CONSULTS
"Care Management Initial Consult    General Information  Assessment completed with:   1.Care Team Member, Jo Ann nurse from [facility]  2. Deepak Kumar - step son  Type of CM/SW Visit: Initial Assessment    Primary Care Provider verified and updated as needed: Yes, updated to Dr. Denis Kaur     Readmission within the last 30 days:           Advance Care Planning: Advance Care Planning Reviewed: other (see comments)   -I spoke with her step son, Deepak, about this. He said no, no formal documents have ever been made. Ana's  passed away about seven years ago. When Deepak started helping more, Ana's cognition was already to a point \"where she wouldn't have known what she was signing so we never were able to get it done.\" Per Deepak, he is the only family she has left. Per Deepak, \"she had one daughter of her own but she passed away many years ago.\"         Communication Assessment  Patient's communication style: spoken language (English or Bilingual)             Cognitive  Cognitive/Neuro/Behavioral: COVID+ so did not enter room to visit with Ana.                       Living Environment:   People in home: facility resident     Current living Arrangements: extended care facility (Memory Care)  Name of Facility: [facility]   Able to return to prior arrangements: yes  Living Arrangement Comments: Has been in COVID isolation since 11/22    Family/Social Support:  Care provided by: other (see comments) (facility staff)  Provides care for: no one, unable/limited ability to care for self  Marital Status:   Support system: Other (specify) (PREMA, patient has dementia and COVID + so unable to visit directly with patient)          Description of Support System:      Support Assessment: Adequate family and caregiver support (Step-son Deepak is the only  she has)    Current Resources:   Patient receiving home care services: No      "   Community Resources: None  Equipment currently used at home: walker, standard, wheelchair, manual  Supplies currently used at home: Incontinence Supplies, Wipes, Chux, Nutritional Supplements    Employment/Financial:  Employment Status: retired        Financial Concerns: none           Does the patient's insurance plan have a 3 day qualifying hospital stay waiver?  No    Lifestyle & Psychosocial Needs:  Social Drivers of Health     Food Insecurity: Not on file   Depression: Not at risk (3/10/2022)    Received from [facility]    PHQ-2     PHQ-2 Score: 0   Housing Stability: Not on file   Tobacco Use: Low Risk  (11/28/2024)    Patient History     Smoking Tobacco Use: Never     Smokeless Tobacco Use: Never     Passive Exposure: Not on file   Financial Resource Strain: Not on file   Alcohol Use: Not on file   Transportation Needs: Not on file   Physical Activity: Not on file   Interpersonal Safety: Not on file   Stress: Not on file   Social Connections: Not on file   Health Literacy: Not on file       Functional Status:  Prior to admission patient needed assistance:   Dependent ADLs:: Ambulation-walker, Bathing, Dressing, Grooming, Incontinence, Positioning, Transfers, Wheelchair-with assist, Toileting  Dependent IADLs:: Cleaning, Cooking, Laundry, Shopping, Meal Preparation, Money Management, Transportation, Incontinence, Medication Management       Mental Health Status:  Mental Health Status: Other (see comment) (struggles with baseline confusion)       Chemical Dependency Status:  Chemical Dependency Status: No Current Concerns             Values/Beliefs:  Spiritual, Cultural Beliefs, Jew Practices, Values that affect care: no               Discussed  Partnership in Safe Discharge Planning  document with patient/family: No    Additional Information:  Ana resides at [facility]. I spoke with Jo Ann (ph. [phone]) who gave me information about family  contact, ADLs and IADLs. I also spoke with her step son Deepak who came to the bedside to visit her.     Ana is able to walk with a walker but they say often her cognition is the limiting factor in her mobility as she can be unwilling to get out of bed. She is normally incontinent of urine, continent of bowels. She was doing physical therapy there but that has since ended. She loves to talk and visit with people. She has been in COVID isolation at her facility since 11/22.       Next Steps: No PT or OT ordered. I would assume once medically stable, would transfer back to Memory Care Unit. Communicate with Jo Ann at facility ([phone]) to arrange discharge. Could sit in a wheelchair for transport back but due to confusion/cognition, would likely need stretcher transport back to facility.     Aga Fletcher RN  [facility]  [phone]

## 2024-11-29 NOTE — PHARMACY-ADMISSION MEDICATION HISTORY
Pharmacist Admission Medication History    Admission medication history is complete. The information provided in this note is only as accurate as the sources available at the time of the update.    Information Source(s): Facility ([facility] medication list/MAR via  Paper copy    Pertinent Information:   Completed med rec with facility MAR that was in the patient chart.    Changes made to PTA medication list:  Added: All  Deleted: Ammonium lactate, flonase, hydrochlorothiazide, metoprolol, simvastatin  Changed: None    Allergies reviewed with patient and updates made in EHR: yes    Medication History Completed By: Ivan Estrada RPH 11/28/2024 8:59 PM    PTA Med List   Medication Sig Last Dose/Taking    acetaminophen (TYLENOL) 500 MG tablet Take 1,000 mg by mouth 3 times daily. 11/28/2024 Evening    calcium carbonate (TUMS) 500 MG chewable tablet Take 3 chew tab by mouth daily. 11/28/2024 Morning    diclofenac (VOLTAREN) 1 % topical gel Apply 4 g topically 2 times daily. 11/28/2024 Morning    dimethicone-zinc oxide (TING PROTECT) external cream Apply topically 4 times daily. 11/28/2024 Evening    dimethicone-zinc oxide (TING PROTECT) external cream Apply topically daily as needed for dry skin or other. Taking As Needed    Lidocaine (LIDOCARE) 4 % Patch Place 1 patch onto the skin daily as needed for moderate pain. To prevent lidocaine toxicity, patient should be patch free for 12 hrs daily. Taking As Needed    LORazepam (ATIVAN) 0.5 MG tablet Take 0.5 mg by mouth daily as needed for sleep. Taking As Needed    melatonin 3 MG tablet Take 9 mg by mouth at bedtime. 11/24/2024 Evening    mineral oil-white petrolatum (EUCERIN/MINERIN) cream Apply topically daily. 11/28/2024 Morning    mirtazapine (REMERON) 7.5 MG tablet Take 7.5 mg by mouth at bedtime. 11/24/2024    nitroGLYcerin (NITROSTAT) 0.4 MG sublingual tablet Place 0.4 mg under the tongue every 5 minutes as needed for chest pain. For chest pain place 1 tablet  under the tongue every 5 minutes for 3 doses. If symptoms persist 5 minutes after 1st dose call 911. Taking As Needed    potassium chloride king ER (KLOR-CON M10) 10 MEQ CR tablet Take 10 mEq by mouth daily. 11/28/2024 Morning    senna-docusate (SENOKOT-S/PERICOLACE) 8.6-50 MG tablet Take 2 tablets by mouth 2 times daily. 11/28/2024 Morning    vitamin D3 (CHOLECALCIFEROL) 50 mcg (2000 units) tablet Take 1 tablet by mouth daily. 11/28/2024 Morning    Zinc Oxide (DESITIN) 13 % CREA Externally apply topically 2 times daily. 11/28/2024 Morning

## 2024-11-29 NOTE — H&P
[facility]    History and Physical - Hospitalist Service       Date of Admission:  11/28/2024    Assessment & Plan   Ana Kumar is a 89 year old female with PMHx of advanced dementia residing in memory care, remote history of colon cancer s/p resection who presents with weakness, decreased responsivness, and concern for a facial droop at her [facility] care. Found to be COVID positive, may have a UTI.    #Acute weakness and lethargy  #Possible UTI  The patient cannot provide a history due to dementia. Reportedly 1 day of these symptoms at her [facility] care, but the available history is limited. CT head with no acute changes to brain but noted signs of sinusitis. CMP and CBC without significant problems. Found to be COVID-19 positive on nasal swab which could be driving her functional decline. UA later returned with nitrites, LE, WBC, and a few squamous cells. Unclear if UTI also present.  - Treat COVID as below  - On Zosyn for sinusitis, monitor urine culture to determine if coverage for UTI needed as well    #COVID-19 upper respiratory infection  #Acute sinusitis  Not hypoxemic on admission, but likely driving her above cognitive and physical changes. High likelihood for superimposed bacterial infection with sinusitis and patient cannot give a history or answer questions.  - Remdesivir x3 days (considered Paxlovid but patient refused PO medications on admission)  - Start Zosyn  - MRSA nasal swab pending    #Major neurocognitive disorder  Resides in [facility] care, would be appropriate to return there when stable for discharge.  - High risk for delirium  - Continue home mirtazapine 7.5mg at bedtime    #Non-ischemic myocardial injury  HS trop 30 to 29. Lower concern for ACS with infectious sources to explain clinical changes.    #CKD 3a  Cr 0.98 on admission, baseline 0.7-0.9.    #Remote history of colon cancer s/p resection    #Code status  Per ED provider who spoke to EMS and saw patient's  "arrival papers from [facility], the patient is DNR/DNI. Reviewed prior hospitalization in 2022 and this was the case then as well.  - DNR/DNI        Diet: Combination Diet Regular Diet Adult    DVT Prophylaxis: Enoxaparin (Lovenox) SQ  [place] Catheter: Not present  Lines: None     Cardiac Monitoring: ACTIVE order. Indication: infection  Code Status: No CPR- Do NOT Intubate      Clinically Significant Risk Factors Present on Admission           # Hypocalcemia: Lowest Ca = 8.6 mg/dL in last 2 days, will monitor and replace as appropriate   # Hypomagnesemia: Lowest Mg = 1.5 mg/dL in last 2 days, will replace as needed   # Hypoalbuminemia: Lowest albumin = 3.3 g/dL at 11/28/2024  8:26 PM, will monitor as appropriate     # Hypertension: Noted on problem list           # Cachexia: Estimated body mass index is 17.01 kg/m  as calculated from the following:    Height as of this encounter: 1.575 m (5' 2\").    Weight as of this encounter: 42.2 kg (93 lb).              Disposition Plan     Medically Ready for Discharge: Anticipated in 2-4 Days           ELSIE KAN MD  Hospitalist Service  [facility]  Securely message with [employer] (more info)  Text page via [facility] Paging/Directory     ______________________________________________________________________    Chief Complaint   Increased weakness    History is obtained from the chart    History of Present Illness   Ana Kumar is a 89 year old female with PMHx of advanced dementia residing in [facility], remote history of colon cancer s/p resection who presents with weakness, decreased responsivness, and concern for a facial droop at her [facility] care. In the ED there were no signs of focal deficits so a code stroke wasn't called. The patient is unable to provide a history or answer questions about symptoms due to her dementia. But she kept saying \"I am going to die tonight.\"    In the ED temp was 99.4F, tachy to the 100s, intermittent " tachypneic, but satting well on room air. Exam notable for extreme frailty, but not in acute distress. Says everything hurts when touched lightly anywhere, but no outward signs of pain. CT head with unremarkable brain scan, but noted paranasal sinus air-fluid levels concerning for acute sinusitis.      Past Medical History    Past Medical History:   Diagnosis Date    Hyperlipidemia LDL goal <100 8/5/2014    Hypertension goal BP (blood pressure) < 140/90 8/5/2014    Personal history of malignant neoplasm of large intestine (COLONIC) 8/21/2014       Past Surgical History   History reviewed. No pertinent surgical history.    Prior to Admission Medications   Prior to Admission Medications   Prescriptions Last Dose Informant Patient Reported? Taking?   LORazepam (ATIVAN) 0.5 MG tablet   Yes Yes   Sig: Take 0.5 mg by mouth daily as needed for sleep.   Lidocaine (LIDOCARE) 4 % Patch   Yes Yes   Sig: Place 1 patch onto the skin daily as needed for moderate pain. To prevent lidocaine toxicity, patient should be patch free for 12 hrs daily.   Zinc Oxide (DESITIN) 13 % CREA 11/28/2024 Morning  Yes Yes   Sig: Externally apply topically 2 times daily.   acetaminophen (TYLENOL) 500 MG tablet 11/28/2024 Evening  Yes Yes   Sig: Take 1,000 mg by mouth 3 times daily.   calcium carbonate (TUMS) 500 MG chewable tablet 11/28/2024 Morning  Yes Yes   Sig: Take 3 chew tab by mouth daily.   diclofenac (VOLTAREN) 1 % topical gel 11/28/2024 Morning  Yes Yes   Sig: Apply 4 g topically 2 times daily.   dimethicone-zinc oxide (TING PROTECT) external cream 11/28/2024 Evening  Yes Yes   Sig: Apply topically 4 times daily.   dimethicone-zinc oxide (TING PROTECT) external cream   Yes Yes   Sig: Apply topically daily as needed for dry skin or other.   melatonin 3 MG tablet 11/24/2024 Evening  Yes Yes   Sig: Take 9 mg by mouth at bedtime.   mineral oil-white petrolatum (EUCERIN/MINERIN) cream 11/28/2024 Morning  Yes Yes   Sig: Apply topically daily.    mirtazapine (REMERON) 7.5 MG tablet 11/24/2024  Yes Yes   Sig: Take 7.5 mg by mouth at bedtime.   nitroGLYcerin (NITROSTAT) 0.4 MG sublingual tablet   Yes Yes   Sig: Place 0.4 mg under the tongue every 5 minutes as needed for chest pain. For chest pain place 1 tablet under the tongue every 5 minutes for 3 doses. If symptoms persist 5 minutes after 1st dose call 911.   potassium chloride king ER (KLOR-CON M10) 10 MEQ CR tablet 11/28/2024 Morning  Yes Yes   Sig: Take 10 mEq by mouth daily.   senna-docusate (SENOKOT-S/PERICOLACE) 8.6-50 MG tablet 11/28/2024 Morning  Yes Yes   Sig: Take 2 tablets by mouth 2 times daily.   vitamin D3 (CHOLECALCIFEROL) 50 mcg (2000 units) tablet 11/28/2024 Morning  Yes Yes   Sig: Take 1 tablet by mouth daily.      Facility-Administered Medications: None           Physical Exam   Vital Signs: Temp: 99.4  F (37.4  C) Temp src: Axillary BP: (!) 147/75 Pulse: 97   Resp: 24 SpO2: 95 % O2 Device: None (Room air)    Weight: 93 lbs 0 oz    General: No overt distress, very thing and frail  HEENT: MMM  Pulmonary: CTAB; no crackles, wheezing, or rhonchi, normal effort  Cardiac: RRR. No m/r/g  Abdomen: Soft. NT, ND.  Extremities: No bilateral LE edema  Neuro: alert and awake, disoriented to person, place, time, situation      Medical Decision Making       80 MINUTES SPENT BY ME on the date of service doing chart review, history, exam, documentation & further activities per the note.      Data     I have personally reviewed the following data over the past 24 hrs:    5.4  \   11.6 (L)   / 213     142 103 21.0 /  100 (H)   3.8 25 0.98 (H) \     ALT: 9 AST: 25 AP: 69 TBILI: 0.2   ALB: 3.3 (L) TOT PROTEIN: 6.0 (L) LIPASE: N/A     Trop: 29 (H) BNP: N/A     Procal: 0.11 CRP: N/A Lactic Acid: 0.7         Imaging results reviewed over the past 24 hrs:   Recent Results (from the past 24 hours)   Head CT w/o contrast    Narrative    EXAM: CT HEAD W/O CONTRAST  LOCATION: [facility]  HOSPITAL  DATE: 11/28/2024    INDICATION: Confusion  COMPARISON: None.  TECHNIQUE: Routine CT Head without IV contrast. Multiplanar reformats. Dose reduction techniques were used.    FINDINGS:  INTRACRANIAL CONTENTS: No intracranial hemorrhage, extraaxial collection, or mass effect.  No CT evidence of acute infarct. Moderate presumed chronic small vessel ischemic changes. Mild to moderate generalized volume loss. No hydrocephalus.     VISUALIZED ORBITS/SINUSES/MASTOIDS: No intraorbital abnormality. Mild mucosal thickening scattered about the paranasal sinuses. Multiple small paranasal sinus air-fluid levels. Please correlate for acute sinusitis. Right mastoid air cells mild to   moderate opacification. Left mastoid air cells essentially clear.    BONES/SOFT TISSUES: No acute abnormality. Left maxillary molar partially visualized cavity.      Impression    IMPRESSION:  1.  No acute intracranial process.    2.  Multiple small paranasal sinus air-fluid levels. Please correlate for acute sinusitis.

## 2024-11-29 NOTE — ED NOTES
Bed: JNED-31  Expected date: 11/28/24  Expected time: 7:46 PM  Means of arrival:   Comments:  [place] 89 F AMS DNR/DNI

## 2024-11-29 NOTE — ED PROVIDER NOTES
"EMERGENCY DEPARTMENT ENCOUNTER      NAME: Ana Kumar  AGE: 89 year old female  YOB: 1928  MRN: [id number]  EVALUATION DATE & TIME: 11/28/2024  7:52 PM    PCP: Chas Vo    ED PROVIDER: Artur Hester M.D.      Chief Complaint   Patient presents with    Stroke Symptoms         FINAL IMPRESSION:  Dementia  Worsening confusion  Left maxillary sinusitis  Hypomagnesemia.    ED COURSE & MEDICAL DECISION MAKING:    Pertinent Labs & Imaging studies reviewed. (See chart for details)  89 year old female presents to the Emergency Department for evaluation of decreased responsiveness, increased confusion possible facial droop.  Patient resides at local assisted care facility.  They were concerned about patient's wellbeing.  Patient markedly confused here reports over and over \"I am going to die tonight\".  Unable to get any other information.  Face is symmetrical.  Mucous membranes are dry.  Moving extremities and purposeful manner.  Will obtain laboratory evaluation assess for evidence of infectious process, dehydration, electrolyte imbalance.  Also try to obtain urine analysis.  CT of the head being obtained out of caution.  Troponin and EKG also.. Patient appears non toxic with stable vitals signs.  Review of records indicate last significant interaction with medical care was in 5/25/2022.  Patient hospitalized at [facility] after a fall intertrochanteric fracture.  Patient with documented history of dementia, hypertension remote colon cancer and trigeminal neuralgia.    8:05 PM I met with the patient for the initial interview and physical examination. Discussed plan for treatment and workup in the ED.    10:03 PM.  Vaginal swab with evidence of white cells but no clue cells, yeast, trichomonas.  Magnesium slightly decreased at 1.5.  Magnesium ordered.  Troponin mildly elevated 30.  Delta troponin will be performed.  CBC with minimal anemia hemoglobin 11.6.  Procalcitonin normal at " 0.11.  Liver function test essentially normal.  Urine analysis pending.  Lactic acid normal at 0.7.  CT of the head reviewed.  Patient with slight opacification of the left maxillary sinus with potential erosion of the base of the sinus associated with adjacent molar.  Infectious process.  Findings confirmed by radiology.  For proceed with antibiotics and hospitalization.  Call placed to the admitting physician.  10:35 PM.  Patient just with the hospitalist.  He is agreeable plan for admission and antibiotics.  At the conclusion of the encounter I discussed the results of all of the tests and the disposition. The questions were answered and return precautions provided. The patient or family acknowledged understanding and was agreeable with the care plan.       MEDICATIONS GIVEN IN THE EMERGENCY:  Medications - No data to display    NEW PRESCRIPTIONS STARTED AT TODAY'S ER VISIT  New Prescriptions    No medications on file          =================================================================    HPI        Ana Kumar is a 89 year old female with a pertient medical history of dementia, colon cancer, hypertension who presents to the ED for evaluation of increased confusion and potential facial droop.  Patient arrives from [facility].  Patient unable provide any history.      REVIEW OF SYSTEMS   Unavailable secondary to patient's underlying dementia and presentation.  PAST MEDICAL HISTORY:  Past Medical History:   Diagnosis Date    Hyperlipidemia LDL goal <100 8/5/2014    Hypertension goal BP (blood pressure) < 140/90 8/5/2014    Personal history of malignant neoplasm of large intestine (COLONIC) 8/21/2014       PAST SURGICAL HISTORY:  History reviewed. No pertinent surgical history.      CURRENT MEDICATIONS:    No current facility-administered medications for this encounter.    Current Outpatient Medications:     ammonium lactate (LAC-HYDRIN) 12 % cream, Apply topically 2 times daily as needed for  "dry skin, Disp: 385 g, Rfl: 3    fluticasone (FLONASE) 50 MCG/ACT nasal spray, Spray 2 sprays into both nostrils daily, Disp: 1 Package, Rfl: 0    hydrochlorothiazide (HYDRODIURIL) 25 MG tablet, Take 1 tablet (25 mg) by mouth daily, Disp: 30 tablet, Rfl:     metoprolol (TOPROL-XL) 25 MG 24 hr tablet, Take 1 tablet (25 mg) by mouth daily, Disp: 30 tablet, Rfl: 1    simvastatin (ZOCOR) 40 MG tablet, Take 1 tablet (40 mg) by mouth At Bedtime, Disp: 90 tablet, Rfl: 3    ALLERGIES:  Allergies   Allergen Reactions    Macrobid [Nitrofurantoin]        FAMILY HISTORY:  History reviewed. No pertinent family history.    SOCIAL HISTORY:   Social History     Socioeconomic History    Marital status:      Spouse name: None    Number of children: None    Years of education: None    Highest education level: None   Tobacco Use    Smoking status: Never    Smokeless tobacco: Never   Substance and Sexual Activity    Alcohol use: No    Drug use: No    Sexual activity: Not Currently     Partners: Male   Other Topics Concern    Parent/sibling w/ CABG, MI or angioplasty before 65F 55M? No       VITALS:  Patient Vitals for the past 24 hrs:   BP Temp Pulse Resp SpO2 Height Weight   11/28/24 1959 (!) 154/81 99.1  F (37.3  C) -- -- -- 1.575 m (5' 2\") 42.2 kg (93 lb)   11/28/24 1955 -- -- 109 26 94 % -- --        PHYSICAL EXAM    Constitutional:  Awake, alert, in moderate apparent distress  HENT:  Normocephalic, Atraumatic. Bilateral external ears normal.  Mucous membranes tacky.  Nose normal. Neck- Normal range of motion with no guarding, No midline cervical tenderness, Supple, No stridor.   Eyes:  PERRL, EOMI with no signs of entrapment, Conjunctiva normal, No discharge.   Respiratory:  Normal breath sounds, No respiratory distress, No wheezing.    Cardiovascular:  Normal heart rate, Normal rhythm, No appreciable rubs or gallops.   GI:  Soft, No tenderness, No distension, No palpable masses  Musculoskeletal:  No edema.  Range of motion " "in all extremities secondary to discomfort.. No tenderness to palpation or major deformities noted.  Integument:  Warm, Dry, No erythema, No rash.   Neurologic: Alert to person.  Keeps repeating \"tonight and going to die\".  Moving extremities purposefully.          LAB:  All pertinent labs reviewed and interpreted.     Results for orders placed or performed during the hospital encounter of 11/28/24   Head CT w/o contrast     Status: None    Narrative    EXAM: CT HEAD W/O CONTRAST  LOCATION: [facility]  DATE: 11/28/2024    INDICATION: Confusion  COMPARISON: None.  TECHNIQUE: Routine CT Head without IV contrast. Multiplanar reformats. Dose reduction techniques were used.    FINDINGS:  INTRACRANIAL CONTENTS: No intracranial hemorrhage, extraaxial collection, or mass effect.  No CT evidence of acute infarct. Moderate presumed chronic small vessel ischemic changes. Mild to moderate generalized volume loss. No hydrocephalus.     VISUALIZED ORBITS/SINUSES/MASTOIDS: No intraorbital abnormality. Mild mucosal thickening scattered about the paranasal sinuses. Multiple small paranasal sinus air-fluid levels. Please correlate for acute sinusitis. Right mastoid air cells mild to   moderate opacification. Left mastoid air cells essentially clear.    BONES/SOFT TISSUES: No acute abnormality. Left maxillary molar partially visualized cavity.      Impression    IMPRESSION:  1.  No acute intracranial process.    2.  Multiple small paranasal sinus air-fluid levels. Please correlate for acute sinusitis.   Comprehensive metabolic panel     Status: Abnormal   Result Value Ref Range    Sodium 142 135 - 145 mmol/L    Potassium 3.8 3.4 - 5.3 mmol/L    Carbon Dioxide (CO2) 25 22 - 29 mmol/L    Anion Gap 14 7 - 15 mmol/L    Urea Nitrogen 21.0 8.0 - 23.0 mg/dL    Creatinine 0.98 (H) 0.51 - 0.95 mg/dL    GFR Estimate 53 (L) >60 mL/min/1.73m2    Calcium 8.6 (L) 8.8 - 10.4 mg/dL    Chloride 103 98 - 107 mmol/L    Glucose " 100 (H) 70 - 99 mg/dL    Alkaline Phosphatase 69 40 - 150 U/L    AST 25 0 - 45 U/L    ALT 9 0 - 50 U/L    Protein Total 6.0 (L) 6.4 - 8.3 g/dL    Albumin 3.3 (L) 3.5 - 5.2 g/dL    Bilirubin Total 0.2 <=1.2 mg/dL   Lactic acid whole blood with 1x repeat in 2 hr when >2     Status: Normal   Result Value Ref Range    Lactic Acid, Initial 0.7 0.7 - 2.0 mmol/L   Procalcitonin     Status: Normal   Result Value Ref Range    Procalcitonin 0.11 <0.50 ng/mL   Troponin T, High Sensitivity     Status: Abnormal   Result Value Ref Range    Troponin T, High Sensitivity 30 (H) <=14 ng/L   Magnesium     Status: Abnormal   Result Value Ref Range    Magnesium 1.5 (L) 1.7 - 2.3 mg/dL   CBC (+ platelets, no diff)     Status: Abnormal   Result Value Ref Range    WBC Count 5.4 4.0 - 11.0 10e3/uL    RBC Count 3.58 (L) 3.80 - 5.20 10e6/uL    Hemoglobin 11.6 (L) 11.7 - 15.7 g/dL    Hematocrit 34.3 (L) 35.0 - 47.0 %    MCV 96 78 - 100 fL    MCH 32.4 26.5 - 33.0 pg    MCHC 33.8 31.5 - 36.5 g/dL    RDW 12.4 10.0 - 15.0 %    Platelet Count 213 150 - 450 10e3/uL   Wet prep     Status: Abnormal    Specimen: Urethra; Swab   Result Value Ref Range    Trichomonas Absent Absent    Yeast Absent Absent    Clue Cells Absent Absent    WBCs/high power field 1+ (A) None        RADIOLOGY:  Reviewed all pertinent imaging. Please see official radiology report.  No orders to display       EKG:    Sinus tachycardia.  Rate 102.  Q waves anteriorly.  No acute ST segment elevation.  No prior tracing for comparison.  I have independently reviewed and interpreted the EKG(s) documented above.      I, Gómez Alegria, am serving as a scribe to document services personally performed by Artur Hester MD, based on my observation and the provider's statements to me. I, Artur Hester MD attest that Gómez Alegria is acting in a scribe capacity, has observed my performance of the services and has documented them in accordance with my direction.    Artur Hester,  M.D.  Emergency Medicine  [facility] EMERGENCY DEPARTMENT       Artur Hester MD  11/28/24 2235       Artur Hester MD  11/28/24 2235       Artur Hester MD  11/28/24 2237

## 2024-11-29 NOTE — ED NOTES
"Patient refused to take due oral medication. She verbalized \"it taste like shit.\" Does not want to drink water or juice as well. Admitting MD informed.  "

## 2024-11-29 NOTE — PLAN OF CARE
Goal Outcome Evaluation:      Plan of Care Reviewed With: patient, other (see comments)    Outcome Evaluation: Once medically ready, discharge back to memory care facility.

## 2024-11-29 NOTE — PROGRESS NOTES
[facility]    Medicine Progress Note - Hospitalist Service    Date of Admission:  11/28/2024    Assessment & Plan   Ana Kumar is a 89 year old female with PMHx of advanced dementia residing in [facility], remote history of colon cancer s/p resection who presents with weakness, decreased responsivness, and concern for a facial droop at her [facility]. Found to be COVID positive, may have a UTI.     #Acute weakness and lethargy  #Possible UTI  The patient cannot provide a history due to dementia. Reportedly 1 day of these symptoms at her [facility], but the available history is limited. CT head with no acute changes to brain but noted signs of sinusitis. CMP and CBC without significant problems. Found to be COVID-19 positive on nasal swab which could be driving her functional decline. UA later returned with nitrites, LE, WBC, and a few squamous cells.  - Treat COVID as below  - Zosyn for sinusitis and UTI      #COVID-19 upper respiratory infection  #Acute sinusitis  Not hypoxemic on admission, but likely driving her above cognitive and physical changes. High likelihood for superimposed bacterial infection with sinusitis and patient cannot give a history or answer questions.  - Remdesivir x3 days (considered Paxlovid but patient refused PO medications on admission)  - Start Zosyn     #Major neurocognitive disorder, Delirium  Resides in [facility] care, would be appropriate to return there when stable for discharge.  - Continue home mirtazapine 7.5mg at bedtime     #Non-ischemic myocardial injury  HS trop 30 to 29. Lower concern for ACS with infectious sources to explain clinical changes.     #CKD 3a  Cr 0.98 on admission, baseline 0.7-0.9.     #Remote history of colon cancer s/p resection     #Code status  Per ED provider who spoke to EMS and saw patient's arrival papers from [facility], the patient is DNR/DNI. Reviewed prior hospitalization in 2022 and this was the case then as  "well.  - DNR/DNI          Diet: Combination Diet Regular Diet Adult    DVT Prophylaxis: Enoxaparin (Lovenox) SQ  Carrasco Catheter: Not present  Lines: None     Cardiac Monitoring: ACTIVE order. Indication: infection  Code Status: No CPR- Do NOT Intubate      Clinically Significant Risk Factors Present on Admission           # Hypocalcemia: Lowest Ca = 7.6 mg/dL in last 2 days, will monitor and replace as appropriate   # Hypomagnesemia: Lowest Mg = 1.5 mg/dL in last 2 days, will replace as needed   # Hypoalbuminemia: Lowest albumin = 3.3 g/dL at 11/28/2024  8:26 PM, will monitor as appropriate     # Hypertension: Noted on problem list           # Cachexia: Estimated body mass index is 17.01 kg/m  as calculated from the following:    Height as of this encounter: 1.575 m (5' 2\").    Weight as of this encounter: 42.2 kg (93 lb).       # Financial/Environmental Concerns: none         Social Drivers of Health            Disposition Plan     Medically Ready for Discharge: Anticipated in 2-4 Days             Hubert Alvarez DO  Hospitalist Service  [facility]  Securely message with [employer] (more info)  Text page via [employer] Paging/Directory   ______________________________________________________________________    Interval History     Physical Exam   Vital Signs: Temp: 98.4  F (36.9  C) Temp src: Axillary BP: (!) 140/71 Pulse: 81   Resp: 18 SpO2: 93 % O2 Device: None (Room air)    Weight: 93 lbs 0 oz    GEN: thin, frail, intermittently agitated and in mild distress, very confused, speech is nonsensical   HEENT: dry mucus membranes, anicteric sclerae  NECK: symmetric without tracheal deviation  CV: reg rhythm, normal rate, audible s1 and s2, no murmurs / rubs / gallops  RESP: clear to auscultation, no rhonchi / rales / wheezes  ABD: non-distended  EXT: no peripheral edema  SKIN: no suspicious skin findings, warm  NEURO: moves all four extremities, no focal deficits      Medical Decision Making       40 " MINUTES SPENT BY ME on the date of service doing chart review, history, exam, documentation & further activities per the note.      Data   ------------------------- PAST 24 HR DATA REVIEWED -----------------------------------------------    I have personally reviewed the following data over the past 24 hrs:    5.3  \   11.4 (L)   / 188     143 107 15.5 /  94   3.6 24 0.69 \     ALT: 9 AST: 25 AP: 69 TBILI: 0.2   ALB: 3.3 (L) TOT PROTEIN: 6.0 (L) LIPASE: N/A     Trop: 29 (H) BNP: N/A     Procal: 0.11 CRP: N/A Lactic Acid: 0.7         Imaging results reviewed over the past 24 hrs:   Recent Results (from the past 24 hours)   Head CT w/o contrast    Narrative    EXAM: CT HEAD W/O CONTRAST  LOCATION: [facility]  DATE: 11/28/2024    INDICATION: Confusion  COMPARISON: None.  TECHNIQUE: Routine CT Head without IV contrast. Multiplanar reformats. Dose reduction techniques were used.    FINDINGS:  INTRACRANIAL CONTENTS: No intracranial hemorrhage, extraaxial collection, or mass effect.  No CT evidence of acute infarct. Moderate presumed chronic small vessel ischemic changes. Mild to moderate generalized volume loss. No hydrocephalus.     VISUALIZED ORBITS/SINUSES/MASTOIDS: No intraorbital abnormality. Mild mucosal thickening scattered about the paranasal sinuses. Multiple small paranasal sinus air-fluid levels. Please correlate for acute sinusitis. Right mastoid air cells mild to   moderate opacification. Left mastoid air cells essentially clear.    BONES/SOFT TISSUES: No acute abnormality. Left maxillary molar partially visualized cavity.      Impression    IMPRESSION:  1.  No acute intracranial process.    2.  Multiple small paranasal sinus air-fluid levels. Please correlate for acute sinusitis.

## 2024-11-30 LAB
ATRIAL RATE - MUSE: 102 BPM
BACTERIA UR CULT: ABNORMAL
CREAT SERPL-MCNC: 0.79 MG/DL (ref 0.51–0.95)
DIASTOLIC BLOOD PRESSURE - MUSE: 81 MMHG
EGFRCR SERPLBLD CKD-EPI 2021: 68 ML/MIN/1.73M2
HOLD SPECIMEN: NORMAL
INTERPRETATION ECG - MUSE: NORMAL
MAGNESIUM SERPL-MCNC: 1.9 MG/DL (ref 1.7–2.3)
P AXIS - MUSE: 74 DEGREES
PR INTERVAL - MUSE: 184 MS
QRS DURATION - MUSE: 70 MS
QT - MUSE: 340 MS
QTC - MUSE: 443 MS
R AXIS - MUSE: -34 DEGREES
SYSTOLIC BLOOD PRESSURE - MUSE: 154 MMHG
T AXIS - MUSE: 86 DEGREES
VENTRICULAR RATE- MUSE: 102 BPM

## 2024-11-30 PROCEDURE — 258N000003 HC RX IP 258 OP 636: Performed by: STUDENT IN AN ORGANIZED HEALTH CARE EDUCATION/TRAINING PROGRAM

## 2024-11-30 PROCEDURE — 99232 SBSQ HOSP IP/OBS MODERATE 35: CPT | Performed by: STUDENT IN AN ORGANIZED HEALTH CARE EDUCATION/TRAINING PROGRAM

## 2024-11-30 PROCEDURE — 250N000013 HC RX MED GY IP 250 OP 250 PS 637: Performed by: STUDENT IN AN ORGANIZED HEALTH CARE EDUCATION/TRAINING PROGRAM

## 2024-11-30 PROCEDURE — 120N000001 HC R&B MED SURG/OB

## 2024-11-30 PROCEDURE — 250N000011 HC RX IP 250 OP 636: Performed by: STUDENT IN AN ORGANIZED HEALTH CARE EDUCATION/TRAINING PROGRAM

## 2024-11-30 PROCEDURE — 82565 ASSAY OF CREATININE: CPT | Performed by: STUDENT IN AN ORGANIZED HEALTH CARE EDUCATION/TRAINING PROGRAM

## 2024-11-30 PROCEDURE — 83735 ASSAY OF MAGNESIUM: CPT | Performed by: STUDENT IN AN ORGANIZED HEALTH CARE EDUCATION/TRAINING PROGRAM

## 2024-11-30 PROCEDURE — 36415 COLL VENOUS BLD VENIPUNCTURE: CPT | Performed by: STUDENT IN AN ORGANIZED HEALTH CARE EDUCATION/TRAINING PROGRAM

## 2024-11-30 RX ORDER — SODIUM CHLORIDE, SODIUM LACTATE, POTASSIUM CHLORIDE, CALCIUM CHLORIDE 600; 310; 30; 20 MG/100ML; MG/100ML; MG/100ML; MG/100ML
INJECTION, SOLUTION INTRAVENOUS CONTINUOUS
Status: DISCONTINUED | OUTPATIENT
Start: 2024-11-30 | End: 2024-12-04

## 2024-11-30 RX ADMIN — POTASSIUM CHLORIDE 10 MEQ: 750 TABLET, EXTENDED RELEASE ORAL at 09:02

## 2024-11-30 RX ADMIN — ENOXAPARIN SODIUM 30 MG: 30 INJECTION SUBCUTANEOUS at 09:03

## 2024-11-30 RX ADMIN — SODIUM CHLORIDE 50 ML: 9 INJECTION, SOLUTION INTRAVENOUS at 09:09

## 2024-11-30 RX ADMIN — PIPERACILLIN AND TAZOBACTAM 3.38 G: 3; .375 INJECTION, POWDER, FOR SOLUTION INTRAVENOUS at 04:08

## 2024-11-30 RX ADMIN — PIPERACILLIN AND TAZOBACTAM 3.38 G: 3; .375 INJECTION, POWDER, FOR SOLUTION INTRAVENOUS at 20:53

## 2024-11-30 RX ADMIN — ACETAMINOPHEN 1000 MG: 500 TABLET ORAL at 09:02

## 2024-11-30 RX ADMIN — REMDESIVIR 100 MG: 100 INJECTION, POWDER, LYOPHILIZED, FOR SOLUTION INTRAVENOUS at 09:08

## 2024-11-30 RX ADMIN — PIPERACILLIN AND TAZOBACTAM 3.38 G: 3; .375 INJECTION, POWDER, FOR SOLUTION INTRAVENOUS at 12:28

## 2024-11-30 RX ADMIN — CALCIUM CARBONATE 1500 MG: 500 TABLET, CHEWABLE ORAL at 09:03

## 2024-11-30 RX ADMIN — SODIUM CHLORIDE, POTASSIUM CHLORIDE, SODIUM LACTATE AND CALCIUM CHLORIDE: 600; 310; 30; 20 INJECTION, SOLUTION INTRAVENOUS at 17:28

## 2024-11-30 ASSESSMENT — ACTIVITIES OF DAILY LIVING (ADL)
ADLS_ACUITY_SCORE: 67
ADLS_ACUITY_SCORE: 71
ADLS_ACUITY_SCORE: 67
ADLS_ACUITY_SCORE: 67
WEAR_GLASSES_OR_BLIND: YES
VISION_MANAGEMENT: GLASSES
ADLS_ACUITY_SCORE: 67
TOILETING: 2-->COMPLETELY DEPENDENT (NOT DEVELOPMENTALLY APPROPRIATE)
ADLS_ACUITY_SCORE: 67
DRESSING/BATHING_DIFFICULTY: YES
TOILETING_MANAGEMENT: INC
HEARING_DIFFICULTY_OR_DEAF: YES
TOILETING_ISSUES: YES
CONCENTRATING,_REMEMBERING_OR_MAKING_DECISIONS_DIFFICULTY: YES
DIFFICULTY_EATING/SWALLOWING: YES
TOILETING_ASSISTANCE: TOILETING DIFFICULTY, DEPENDENT
ADLS_ACUITY_SCORE: 71
DIFFICULTY_COMMUNICATING: YES
PATIENT'S_PREFERRED_MEANS_OF_COMMUNICATION: VERBAL
DESCRIBE_HEARING_LOSS: HEARING LOSS ON RIGHT SIDE;HEARING LOSS ON LEFT SIDE;BILATERAL HEARING LOSS
ADLS_ACUITY_SCORE: 71
ADLS_ACUITY_SCORE: 67
TOILETING: 2-->COMPLETELY DEPENDENT
ADLS_ACUITY_SCORE: 71
CHANGE_IN_FUNCTIONAL_STATUS_SINCE_ONSET_OF_CURRENT_ILLNESS/INJURY: YES
ADLS_ACUITY_SCORE: 71
ADLS_ACUITY_SCORE: 71
ADLS_ACUITY_SCORE: 67
WERE_AUXILIARY_AIDS_OFFERED?: NO
DOING_ERRANDS_INDEPENDENTLY_DIFFICULTY: YES
ADLS_ACUITY_SCORE: 71
WALKING_OR_CLIMBING_STAIRS: TRANSFERRING DIFFICULTY, DEPENDENT
EATING/SWALLOWING: EATING
ADLS_ACUITY_SCORE: 71
ADLS_ACUITY_SCORE: 67
EATING/SWALLOWING_MANAGEMENT: PT REFUSING
ADLS_ACUITY_SCORE: 71
ADLS_ACUITY_SCORE: 71
DRESSING/BATHING: DRESSING DIFFICULTY, DEPENDENT
WALKING_OR_CLIMBING_STAIRS_DIFFICULTY: YES
ADLS_ACUITY_SCORE: 71
ADLS_ACUITY_SCORE: 71

## 2024-11-30 NOTE — PROGRESS NOTES
[facility]    Medicine Progress Note - Hospitalist Service    Date of Admission:  11/28/2024    Assessment & Plan   Ana Kumar is a 89 year old female with PMHx of advanced dementia residing in [facility], remote history of colon cancer s/p resection who presents with weakness, decreased responsivness, and concern for a facial droop at her [facility]. Found to be COVID positive, may have a UTI.     #Acute weakness and lethargy  #Possible UTI  The patient cannot provide a history due to dementia. Reportedly 1 day of these symptoms at her [facility], but the available history is limited. CT head with no acute changes to brain but noted signs of sinusitis. CMP and CBC without significant problems. Found to be COVID-19 positive on nasal swab which could be driving her functional decline. UA later returned with nitrites, LE, WBC, and a few squamous cells.  - Treat COVID as below  - Zosyn for sinusitis and UTI      #COVID-19 upper respiratory infection  #Acute sinusitis  Not hypoxemic on admission, but likely driving her above cognitive and physical changes. High likelihood for superimposed bacterial infection with sinusitis and patient cannot give a history or answer questions.  - Remdesivir x3 days (considered Paxlovid but patient refused PO medications on admission)  - Start Zosyn     #Major neurocognitive disorder, Delirium  Resides in [facility] care, would be appropriate to return there when stable for discharge.  - Continue home mirtazapine 7.5mg at bedtime     #Non-ischemic myocardial injury  HS trop 30 to 29. Lower concern for ACS with infectious sources to explain clinical changes.     #CKD 3a  Cr 0.98 on admission, baseline 0.7-0.9.     #Remote history of colon cancer s/p resection     #Code status  Per ED provider who spoke to EMS and saw patient's arrival papers from [facility], the patient is DNR/DNI. Reviewed prior hospitalization in 2022 and this was the case then as  "well.  - DNR/DNI          Diet: Combination Diet Regular Diet Adult    DVT Prophylaxis: Enoxaparin (Lovenox) SQ  Carrasco Catheter: Not present  Lines: None     Cardiac Monitoring: None  Code Status: No CPR- Do NOT Intubate      Clinically Significant Risk Factors           # Hypocalcemia: Lowest Ca = 7.6 mg/dL in last 2 days, will monitor and replace as appropriate   # Hypomagnesemia: Lowest Mg = 1.5 mg/dL in last 2 days, will replace as needed   # Hypoalbuminemia: Lowest albumin = 3.3 g/dL at 11/28/2024  8:26 PM, will monitor as appropriate     # Hypertension: Noted on problem list            # Cachexia: Estimated body mass index is 17.01 kg/m  as calculated from the following:    Height as of this encounter: 1.575 m (5' 2\").    Weight as of this encounter: 42.2 kg (93 lb)., PRESENT ON ADMISSION       # Financial/Environmental Concerns: none         Social Drivers of Health            Disposition Plan     Medically Ready for Discharge: Anticipated in 2-4 Days             Hubert Alvarez DO  Hospitalist Service  [facility]  Securely message with [employer] (more info)  Text page via [facility] Paging/Directory   ______________________________________________________________________    Interval History     Physical Exam   Vital Signs: Temp: 97.2  F (36.2  C) Temp src: Axillary BP: 135/62 Pulse: 81   Resp: 20 SpO2: 91 % O2 Device: None (Room air)    Weight: 93 lbs 0 oz    GEN: thin, frail, intermittently agitated and in mild distress, very confused, speech is nonsensical   HEENT: dry mucus membranes, anicteric sclerae  NECK: symmetric without tracheal deviation  CV: reg rhythm, normal rate, audible s1 and s2, no murmurs / rubs / gallops  RESP: clear to auscultation, no rhonchi / rales / wheezes  ABD: non-distended  EXT: no peripheral edema  SKIN: no suspicious skin findings, warm  NEURO: moves all four extremities, no focal deficits      Medical Decision Making       41 MINUTES SPENT BY ME on the date " of service doing chart review, history, exam, documentation & further activities per the note.      Data   ------------------------- PAST 24 HR DATA REVIEWED -----------------------------------------------        Imaging results reviewed over the past 24 hrs:   No results found for this or any previous visit (from the past 24 hours).

## 2024-11-30 NOTE — PLAN OF CARE
Problem: Adult Inpatient Plan of Care  Goal: Absence of Hospital-Acquired Illness or Injury  Intervention: Identify and Manage Fall Risk  Recent Flowsheet Documentation  Taken 11/30/2024 0108 by Rebekah Smith, RN  Safety Promotion/Fall Prevention:   clutter free environment maintained   increased rounding and observation   patient and family education     Problem: Adult Inpatient Plan of Care  Goal: Absence of Hospital-Acquired Illness or Injury  Intervention: Prevent Skin Injury  Recent Flowsheet Documentation  Taken 11/30/2024 0108 by Rebekah Smith, RN  Body Position:   turned   right     Problem: Infection  Goal: Absence of Infection Signs and Symptoms  Outcome: Progressing     Problem: Adult Inpatient Plan of Care  Goal: Optimal Comfort and Wellbeing  Outcome: Progressing   Goal Outcome Evaluation:       Pt on special precautions. Pt is on tele, 1st degree heart block with pvcs. Pt denies pain and non verbal signs of pain. Mepilex applied to sacrum. Pt turned very 2 hours.

## 2024-11-30 NOTE — PROVIDER NOTIFICATION
Notified provider of poor intake and output. Request small maintenance IV to help with hydration.

## 2024-11-30 NOTE — PLAN OF CARE
"Goal Outcome Evaluation:         Pt has not tried to get up out of bed this shift. Pt cries out \"Im dying\" when staff tries to assist her. Pt denies pain. It is difficult to communicate wit pt as she doesnt answer question appropriately. Pt is refusing to eat or drink or take meds. Pt keeps saying, \"It tastes like shit\". This writer completed oral cares, attempted to feed pt, x3 and explained all caresand gave orientation. Assisted with turning pt side to side and propped heels with pillows. Pt has congested cough. Maintaining o2 sats on room air without laboring.       Problem: Adult Inpatient Plan of Care  Goal: Absence of Hospital-Acquired Illness or Injury  Intervention: Identify and Manage Fall Risk  Recent Flowsheet Documentation  Taken 11/29/2024 2100 by Jefferson Willingham, RN  Safety Promotion/Fall Prevention:   clutter free environment maintained   increased rounding and observation   patient and family education     Problem: Infection  Goal: Absence of Infection Signs and Symptoms  Outcome: Progressing     "

## 2024-12-01 LAB
ALBUMIN SERPL BCG-MCNC: 2.9 G/DL (ref 3.5–5.2)
ALP SERPL-CCNC: 60 U/L (ref 40–150)
ALT SERPL W P-5'-P-CCNC: 10 U/L (ref 0–50)
ANION GAP SERPL CALCULATED.3IONS-SCNC: 10 MMOL/L (ref 7–15)
AST SERPL W P-5'-P-CCNC: 25 U/L (ref 0–45)
BILIRUB SERPL-MCNC: 0.3 MG/DL
BUN SERPL-MCNC: 24.6 MG/DL (ref 8–23)
CALCIUM SERPL-MCNC: 8.4 MG/DL (ref 8.8–10.4)
CHLORIDE SERPL-SCNC: 107 MMOL/L (ref 98–107)
CREAT SERPL-MCNC: 0.68 MG/DL (ref 0.51–0.95)
EGFRCR SERPLBLD CKD-EPI 2021: 79 ML/MIN/1.73M2
ERYTHROCYTE [DISTWIDTH] IN BLOOD BY AUTOMATED COUNT: 12.4 % (ref 10–15)
GLUCOSE SERPL-MCNC: 91 MG/DL (ref 70–99)
HCO3 SERPL-SCNC: 26 MMOL/L (ref 22–29)
HCT VFR BLD AUTO: 33.9 % (ref 35–47)
HGB BLD-MCNC: 11.2 G/DL (ref 11.7–15.7)
MAGNESIUM SERPL-MCNC: 1.5 MG/DL (ref 1.7–2.3)
MAGNESIUM SERPL-MCNC: 2 MG/DL (ref 1.7–2.3)
MCH RBC QN AUTO: 31.9 PG (ref 26.5–33)
MCHC RBC AUTO-ENTMCNC: 33 G/DL (ref 31.5–36.5)
MCV RBC AUTO: 97 FL (ref 78–100)
PLATELET # BLD AUTO: 216 10E3/UL (ref 150–450)
POTASSIUM SERPL-SCNC: 3.3 MMOL/L (ref 3.4–5.3)
POTASSIUM SERPL-SCNC: 3.8 MMOL/L (ref 3.4–5.3)
PROT SERPL-MCNC: 5.6 G/DL (ref 6.4–8.3)
RBC # BLD AUTO: 3.51 10E6/UL (ref 3.8–5.2)
SODIUM SERPL-SCNC: 143 MMOL/L (ref 135–145)
WBC # BLD AUTO: 4.4 10E3/UL (ref 4–11)

## 2024-12-01 PROCEDURE — 84075 ASSAY ALKALINE PHOSPHATASE: CPT | Performed by: STUDENT IN AN ORGANIZED HEALTH CARE EDUCATION/TRAINING PROGRAM

## 2024-12-01 PROCEDURE — 83735 ASSAY OF MAGNESIUM: CPT | Performed by: STUDENT IN AN ORGANIZED HEALTH CARE EDUCATION/TRAINING PROGRAM

## 2024-12-01 PROCEDURE — 250N000011 HC RX IP 250 OP 636: Performed by: STUDENT IN AN ORGANIZED HEALTH CARE EDUCATION/TRAINING PROGRAM

## 2024-12-01 PROCEDURE — 250N000013 HC RX MED GY IP 250 OP 250 PS 637: Performed by: STUDENT IN AN ORGANIZED HEALTH CARE EDUCATION/TRAINING PROGRAM

## 2024-12-01 PROCEDURE — 82247 BILIRUBIN TOTAL: CPT | Performed by: STUDENT IN AN ORGANIZED HEALTH CARE EDUCATION/TRAINING PROGRAM

## 2024-12-01 PROCEDURE — 84132 ASSAY OF SERUM POTASSIUM: CPT | Performed by: STUDENT IN AN ORGANIZED HEALTH CARE EDUCATION/TRAINING PROGRAM

## 2024-12-01 PROCEDURE — 85018 HEMOGLOBIN: CPT | Performed by: STUDENT IN AN ORGANIZED HEALTH CARE EDUCATION/TRAINING PROGRAM

## 2024-12-01 PROCEDURE — 80053 COMPREHEN METABOLIC PANEL: CPT | Performed by: STUDENT IN AN ORGANIZED HEALTH CARE EDUCATION/TRAINING PROGRAM

## 2024-12-01 PROCEDURE — 258N000003 HC RX IP 258 OP 636: Performed by: STUDENT IN AN ORGANIZED HEALTH CARE EDUCATION/TRAINING PROGRAM

## 2024-12-01 PROCEDURE — 36415 COLL VENOUS BLD VENIPUNCTURE: CPT | Performed by: STUDENT IN AN ORGANIZED HEALTH CARE EDUCATION/TRAINING PROGRAM

## 2024-12-01 PROCEDURE — 99232 SBSQ HOSP IP/OBS MODERATE 35: CPT | Performed by: STUDENT IN AN ORGANIZED HEALTH CARE EDUCATION/TRAINING PROGRAM

## 2024-12-01 PROCEDURE — 120N000001 HC R&B MED SURG/OB

## 2024-12-01 RX ORDER — CEFEPIME HYDROCHLORIDE 1 G/1
1 INJECTION, POWDER, FOR SOLUTION INTRAMUSCULAR; INTRAVENOUS EVERY 24 HOURS
Status: DISCONTINUED | OUTPATIENT
Start: 2024-12-01 | End: 2024-12-04

## 2024-12-01 RX ORDER — POTASSIUM CHLORIDE 7.45 MG/ML
10 INJECTION INTRAVENOUS
Status: COMPLETED | OUTPATIENT
Start: 2024-12-01 | End: 2024-12-01

## 2024-12-01 RX ORDER — MAGNESIUM SULFATE HEPTAHYDRATE 40 MG/ML
2 INJECTION, SOLUTION INTRAVENOUS ONCE
Status: COMPLETED | OUTPATIENT
Start: 2024-12-01 | End: 2024-12-01

## 2024-12-01 RX ADMIN — POTASSIUM CHLORIDE 10 MEQ: 7.46 INJECTION, SOLUTION INTRAVENOUS at 09:59

## 2024-12-01 RX ADMIN — ENOXAPARIN SODIUM 30 MG: 30 INJECTION SUBCUTANEOUS at 08:37

## 2024-12-01 RX ADMIN — PIPERACILLIN AND TAZOBACTAM 3.38 G: 3; .375 INJECTION, POWDER, FOR SOLUTION INTRAVENOUS at 05:28

## 2024-12-01 RX ADMIN — REMDESIVIR 100 MG: 100 INJECTION, POWDER, LYOPHILIZED, FOR SOLUTION INTRAVENOUS at 09:53

## 2024-12-01 RX ADMIN — SODIUM CHLORIDE, POTASSIUM CHLORIDE, SODIUM LACTATE AND CALCIUM CHLORIDE: 600; 310; 30; 20 INJECTION, SOLUTION INTRAVENOUS at 18:45

## 2024-12-01 RX ADMIN — ONDANSETRON 4 MG: 2 INJECTION INTRAMUSCULAR; INTRAVENOUS at 03:06

## 2024-12-01 RX ADMIN — CEFEPIME HYDROCHLORIDE 1 G: 1 INJECTION, POWDER, FOR SOLUTION INTRAMUSCULAR; INTRAVENOUS at 12:41

## 2024-12-01 RX ADMIN — SODIUM CHLORIDE 50 ML: 9 INJECTION, SOLUTION INTRAVENOUS at 09:56

## 2024-12-01 RX ADMIN — SENNOSIDES AND DOCUSATE SODIUM 2 TABLET: 8.6; 5 TABLET ORAL at 19:29

## 2024-12-01 RX ADMIN — POTASSIUM CHLORIDE 10 MEQ: 7.46 INJECTION, SOLUTION INTRAVENOUS at 10:04

## 2024-12-01 RX ADMIN — ACETAMINOPHEN 1000 MG: 500 TABLET ORAL at 19:28

## 2024-12-01 RX ADMIN — MAGNESIUM SULFATE HEPTAHYDRATE 2 G: 40 INJECTION, SOLUTION INTRAVENOUS at 10:10

## 2024-12-01 ASSESSMENT — ACTIVITIES OF DAILY LIVING (ADL)
ADLS_ACUITY_SCORE: 87
ADLS_ACUITY_SCORE: 83
ADLS_ACUITY_SCORE: 79
ADLS_ACUITY_SCORE: 75
ADLS_ACUITY_SCORE: 75
ADLS_ACUITY_SCORE: 83
ADLS_ACUITY_SCORE: 83
ADLS_ACUITY_SCORE: 75
ADLS_ACUITY_SCORE: 83
ADLS_ACUITY_SCORE: 83
ADLS_ACUITY_SCORE: 87
ADLS_ACUITY_SCORE: 87
ADLS_ACUITY_SCORE: 75
ADLS_ACUITY_SCORE: 87
ADLS_ACUITY_SCORE: 79
ADLS_ACUITY_SCORE: 83
ADLS_ACUITY_SCORE: 75
ADLS_ACUITY_SCORE: 87
ADLS_ACUITY_SCORE: 83
ADLS_ACUITY_SCORE: 87
ADLS_ACUITY_SCORE: 79

## 2024-12-01 NOTE — PROGRESS NOTES
[facility]    Medicine Progress Note - Hospitalist Service    Date of Admission:  11/28/2024    Assessment & Plan   Ana Kumar is a 89 year old female with PMHx of advanced dementia residing in [facility], remote history of colon cancer s/p resection who presents with weakness, decreased responsivness, and concern for a facial droop at her [facility] care. Found to be COVID positive, and a UTI.     #Acute weakness and lethargy  #UTI  The patient cannot provide a history due to dementia. Reportedly 1 day of these symptoms at her [facility], but the available history is limited. CT head with no acute changes to brain but noted signs of sinusitis. CMP and CBC without significant problems. Found to be COVID-19 positive on nasal swab which could be driving her functional decline. UA later returned with nitrites, LE, WBC, and a few squamous cells.  - Treat COVID as below  - Zosyn for sinusitis and UTI   - IVF     #COVID-19 upper respiratory infection  #Acute sinusitis  Not hypoxemic on admission, but likely driving her above cognitive and physical changes. High likelihood for superimposed bacterial infection with sinusitis and patient cannot give a history or answer questions.  - Remdesivir x3 days (considered Paxlovid but patient refused PO medications on admission)  - Zosyn     #Major neurocognitive disorder, Delirium  Resides in [facility] care, would be appropriate to return there when stable for discharge.  - Continue home mirtazapine 7.5mg at bedtime     #Non-ischemic myocardial injury  HS trop 30 to 29. Lower concern for ACS with infectious sources to explain clinical changes.     #CKD 3a  Cr 0.98 on admission, baseline 0.7-0.9.     #Remote history of colon cancer s/p resection     #Code status  Per ED provider who spoke to EMS and saw patient's arrival papers from [facility], the patient is DNR/DNI. Reviewed prior hospitalization in 2022 and this was the case then as well.  -  "DNR/DNI          Diet: Combination Diet Regular Diet Adult    DVT Prophylaxis: Enoxaparin (Lovenox) SQ  Carrasco Catheter: Not present  Lines: None     Cardiac Monitoring: None  Code Status: No CPR- Do NOT Intubate      Clinically Significant Risk Factors        # Hypokalemia: Lowest K = 3.3 mmol/L in last 2 days, will replace as needed      # Hypomagnesemia: Lowest Mg = 1.5 mg/dL in last 2 days, will replace as needed   # Hypoalbuminemia: Lowest albumin = 2.9 g/dL at 12/1/2024  6:29 AM, will monitor as appropriate     # Hypertension: Noted on problem list            # Cachexia: Estimated body mass index is 17.01 kg/m  as calculated from the following:    Height as of this encounter: 1.575 m (5' 2\").    Weight as of this encounter: 42.2 kg (93 lb)., PRESENT ON ADMISSION       # Financial/Environmental Concerns: none         Social Drivers of Health            Disposition Plan     Medically Ready for Discharge: Anticipated Tomorrow             Hbuert Alvarez DO  Hospitalist Service  [facility]  Securely message with [employer] (more info)  Text page via [employer] Paging/Directory   ______________________________________________________________________    Interval History     Physical Exam   Vital Signs: Temp: 97.8  F (36.6  C) Temp src: Axillary BP: (!) 166/83 Pulse: 84   Resp: 17 SpO2: 96 % O2 Device: Nasal cannula Oxygen Delivery: 2 LPM  Weight: 93 lbs 0 oz    GEN: thin, frail, intermittently agitated and in mild distress, very confused, speech is nonsensical   HEENT: dry mucus membranes, anicteric sclerae  NECK: symmetric without tracheal deviation  CV: reg rhythm, normal rate, audible s1 and s2, no murmurs / rubs / gallops  RESP: clear to auscultation, no rhonchi / rales / wheezes  ABD: non-distended  EXT: no peripheral edema  SKIN: no suspicious skin findings, warm  NEURO: moves all four extremities, no focal deficits      Medical Decision Making       35 MINUTES SPENT BY ME on the date of " service doing chart review, history, exam, documentation & further activities per the note.      Data   ------------------------- PAST 24 HR DATA REVIEWED -----------------------------------------------    I have personally reviewed the following data over the past 24 hrs:    4.4  \   11.2 (L)   / 216     143 107 24.6 (H) /  91   3.3 (L) 26 0.68 \     ALT: 10 AST: 25 AP: 60 TBILI: 0.3   ALB: 2.9 (L) TOT PROTEIN: 5.6 (L) LIPASE: N/A       Imaging results reviewed over the past 24 hrs:   No results found for this or any previous visit (from the past 24 hours).

## 2024-12-01 NOTE — PLAN OF CARE
Problem: Infection  Goal: Absence of Infection Signs and Symptoms  Outcome: Progressing     Problem: Malnutrition  Goal: Improved Nutritional Intake  Outcome: Not Progressing   Goal Outcome Evaluation:       Pt continues to refuse food ,fluids and medications orally. Pt agrees to take IV antibiotics. MD was notified and IV fluids were ordered. Pt receiving IV antibiotics. Turning pt every 2 hours and as needed.

## 2024-12-01 NOTE — PROGRESS NOTES
Pt oxygen alarm consistently alarming. Pt soundly sleeping (snoring) and SpO2 on RA at 88%.   - Added 2 L via NC. SpO2 up to 96%    Diandra Michelle RN-BC

## 2024-12-01 NOTE — PLAN OF CARE
Problem: Malnutrition  Goal: Improved Nutritional Intake  Outcome: Progressing     Problem: Infection  Goal: Absence of Infection Signs and Symptoms  Outcome: Progressing  Intervention: Prevent or Manage Infection  Recent Flowsheet Documentation  Taken 12/1/2024 0841 by Kat Borja RN  Isolation Precautions: special precautions maintained   Goal Outcome Evaluation:         Unable to assess orientation, patient not responding to questions. Illogical speech.   No visible signs of pain and discomfort.   Repositioned q 2 h and as needed.   Purewick with good UO.    Refuses all po intake. Refusing po meds as well. Provider notified.     VSS. O2 SATs wdl with NC 2 L.     IVF. IV antibiotics. IV remdesivir.     Mg and K protocols, both replaced per orders, next re check 15:20 pm.

## 2024-12-01 NOTE — PLAN OF CARE
"  Problem: Adult Inpatient Plan of Care  Goal: Absence of Hospital-Acquired Illness or Injury  Intervention: Prevent Infection  Recent Flowsheet Documentation  Taken 11/30/2024 9580 by Rebekah Smith, RN  Infection Prevention:   rest/sleep promoted   single patient room provided     Problem: Adult Inpatient Plan of Care  Goal: Optimal Comfort and Wellbeing  Outcome: Progressing     Problem: Malnutrition  Goal: Improved Nutritional Intake  Outcome: Progressing   Goal Outcome Evaluation:       Pt has zosyn running right now. LR on hold until 830 because zosyn per dr. Pt oriented to self. Pt turned every 2 hours. Pt said, \"I'm going to puke, I'm sick\" PRN zofran given and was effective. Pt on tele. Pt denies pain and no non verbal signs of pain. Pt on special precautions.                  "

## 2024-12-01 NOTE — PLAN OF CARE
Problem: Infection  Goal: Absence of Infection Signs and Symptoms  Outcome: Progressing   Goal Outcome Evaluation:    Patient is A/O to self only;VSS on 2L NC; refused PO fluids; LR at 75 when IV antibiotics not running; denies pain; Mag recheck in am

## 2024-12-02 LAB
ALBUMIN SERPL BCG-MCNC: 2.7 G/DL (ref 3.5–5.2)
ALP SERPL-CCNC: 55 U/L (ref 40–150)
ALT SERPL W P-5'-P-CCNC: 10 U/L (ref 0–50)
ANION GAP SERPL CALCULATED.3IONS-SCNC: 9 MMOL/L (ref 7–15)
AST SERPL W P-5'-P-CCNC: 23 U/L (ref 0–45)
BILIRUB SERPL-MCNC: 0.3 MG/DL
BUN SERPL-MCNC: 17.4 MG/DL (ref 8–23)
CALCIUM SERPL-MCNC: 8.1 MG/DL (ref 8.8–10.4)
CHLORIDE SERPL-SCNC: 98 MMOL/L (ref 98–107)
CREAT SERPL-MCNC: 0.58 MG/DL (ref 0.51–0.95)
EGFRCR SERPLBLD CKD-EPI 2021: 82 ML/MIN/1.73M2
ERYTHROCYTE [DISTWIDTH] IN BLOOD BY AUTOMATED COUNT: 11.9 % (ref 10–15)
GLUCOSE SERPL-MCNC: 89 MG/DL (ref 70–99)
HCO3 SERPL-SCNC: 28 MMOL/L (ref 22–29)
HCT VFR BLD AUTO: 34 % (ref 35–47)
HGB BLD-MCNC: 11.4 G/DL (ref 11.7–15.7)
HOLD SPECIMEN: NORMAL
HOLD SPECIMEN: NORMAL
MAGNESIUM SERPL-MCNC: 1.6 MG/DL (ref 1.7–2.3)
MCH RBC QN AUTO: 32.1 PG (ref 26.5–33)
MCHC RBC AUTO-ENTMCNC: 33.5 G/DL (ref 31.5–36.5)
MCV RBC AUTO: 96 FL (ref 78–100)
PLATELET # BLD AUTO: 221 10E3/UL (ref 150–450)
POTASSIUM SERPL-SCNC: 3.3 MMOL/L (ref 3.4–5.3)
POTASSIUM SERPL-SCNC: 3.7 MMOL/L (ref 3.4–5.3)
PROT SERPL-MCNC: 5.2 G/DL (ref 6.4–8.3)
RBC # BLD AUTO: 3.55 10E6/UL (ref 3.8–5.2)
SODIUM SERPL-SCNC: 135 MMOL/L (ref 135–145)
WBC # BLD AUTO: 3.8 10E3/UL (ref 4–11)

## 2024-12-02 PROCEDURE — 120N000001 HC R&B MED SURG/OB

## 2024-12-02 PROCEDURE — 85027 COMPLETE CBC AUTOMATED: CPT | Performed by: STUDENT IN AN ORGANIZED HEALTH CARE EDUCATION/TRAINING PROGRAM

## 2024-12-02 PROCEDURE — 84132 ASSAY OF SERUM POTASSIUM: CPT | Performed by: HOSPITALIST

## 2024-12-02 PROCEDURE — 84155 ASSAY OF PROTEIN SERUM: CPT | Performed by: STUDENT IN AN ORGANIZED HEALTH CARE EDUCATION/TRAINING PROGRAM

## 2024-12-02 PROCEDURE — 36415 COLL VENOUS BLD VENIPUNCTURE: CPT | Performed by: STUDENT IN AN ORGANIZED HEALTH CARE EDUCATION/TRAINING PROGRAM

## 2024-12-02 PROCEDURE — 250N000011 HC RX IP 250 OP 636: Performed by: STUDENT IN AN ORGANIZED HEALTH CARE EDUCATION/TRAINING PROGRAM

## 2024-12-02 PROCEDURE — 250N000013 HC RX MED GY IP 250 OP 250 PS 637: Performed by: STUDENT IN AN ORGANIZED HEALTH CARE EDUCATION/TRAINING PROGRAM

## 2024-12-02 PROCEDURE — 83735 ASSAY OF MAGNESIUM: CPT | Performed by: STUDENT IN AN ORGANIZED HEALTH CARE EDUCATION/TRAINING PROGRAM

## 2024-12-02 PROCEDURE — 250N000011 HC RX IP 250 OP 636: Performed by: HOSPITALIST

## 2024-12-02 PROCEDURE — 99232 SBSQ HOSP IP/OBS MODERATE 35: CPT | Performed by: HOSPITALIST

## 2024-12-02 PROCEDURE — 82040 ASSAY OF SERUM ALBUMIN: CPT | Performed by: STUDENT IN AN ORGANIZED HEALTH CARE EDUCATION/TRAINING PROGRAM

## 2024-12-02 PROCEDURE — 36415 COLL VENOUS BLD VENIPUNCTURE: CPT | Performed by: HOSPITALIST

## 2024-12-02 RX ORDER — POTASSIUM CHLORIDE 7.45 MG/ML
10 INJECTION INTRAVENOUS
Status: COMPLETED | OUTPATIENT
Start: 2024-12-02 | End: 2024-12-02

## 2024-12-02 RX ADMIN — MIRTAZAPINE 7.5 MG: 7.5 TABLET, FILM COATED ORAL at 21:14

## 2024-12-02 RX ADMIN — ENOXAPARIN SODIUM 30 MG: 30 INJECTION SUBCUTANEOUS at 09:44

## 2024-12-02 RX ADMIN — POTASSIUM CHLORIDE 10 MEQ: 7.46 INJECTION, SOLUTION INTRAVENOUS at 09:42

## 2024-12-02 RX ADMIN — CEFEPIME HYDROCHLORIDE 1 G: 1 INJECTION, POWDER, FOR SOLUTION INTRAMUSCULAR; INTRAVENOUS at 12:00

## 2024-12-02 RX ADMIN — ACETAMINOPHEN 1000 MG: 500 TABLET ORAL at 21:15

## 2024-12-02 RX ADMIN — SENNOSIDES AND DOCUSATE SODIUM 2 TABLET: 8.6; 5 TABLET ORAL at 21:15

## 2024-12-02 RX ADMIN — POTASSIUM CHLORIDE 10 MEQ: 7.46 INJECTION, SOLUTION INTRAVENOUS at 09:35

## 2024-12-02 ASSESSMENT — ACTIVITIES OF DAILY LIVING (ADL)
ADLS_ACUITY_SCORE: 87

## 2024-12-02 NOTE — PLAN OF CARE
Problem: Malnutrition  Goal: Improved Nutritional Intake  Outcome: Progressing     Problem: Fall Injury Risk  Goal: Absence of Fall and Fall-Related Injury  Outcome: Progressing  Intervention: Identify and Manage Contributors  Recent Flowsheet Documentation  Taken 12/2/2024 0100 by Tawanda Sanchez RN  Medication Review/Management: medications reviewed  Intervention: Promote Injury-Free Environment  Recent Flowsheet Documentation  Taken 12/2/2024 0100 by Tawanda Sanchez RN  Safety Promotion/Fall Prevention:   activity supervised   assistive device/personal items within reach   clutter free environment maintained   lighting adjusted   nonskid shoes/slippers when out of bed   patient and family education   room near nurse's station   safety round/check completed     Problem: Adult Inpatient Plan of Care  Goal: Optimal Comfort and Wellbeing  Outcome: Progressing   Goal Outcome Evaluation:      Plan of Care Reviewed With: patient    Overall Patient Progress: improving    A&O to self.  Denies pain. Assist 2. LR 75 ml/hr.  Pt placed on oxy mask due to O2 stats dropping down into the 88%.  Plan of care ongoing.

## 2024-12-02 NOTE — PROGRESS NOTES
[facility]    Medicine Progress Note - Hospitalist Service    Date of Admission:  11/28/2024    Assessment & Plan   Ana Kumar is a 89 year old female with PMHx of advanced dementia residing in [facility], remote history of colon cancer s/p resection who presents with weakness, decreased responsivness, and concern for a facial droop at her [facility] care. Found to be COVID positive, and a UTI.     #Acute weakness and lethargy  #UTI  The patient cannot provide a history due to dementia. Reportedly 1 day of these symptoms at her [facility], but the available history is limited. CT head with no acute changes to brain but noted signs of sinusitis.  Found to be COVID-19 positive on nasal swab which could be driving her functional decline. UA also s/o UTI.   - Treating COVID as below  - UA s/o UTI, cx + for enterobacter complex, IV Zosyn switched to cefepime to Rx UTI and sinusitis.   - IVF as pt has poor PO intake     #COVID-19 upper respiratory infection  #Acute sinusitis  Not hypoxemic on admission but now needing 2L o2 by NC.   High likelihood for superimposed bacterial infection with sinusitis   -Tested positive on 11/28  - Competed Remdesivir x3 days (considered Paxlovid but patient refused PO medications on admission)  - Cefepime for sinusitis and UTI.     #Major neurocognitive disorder, Delirium  Resides in [facility] care, would be appropriate to return there when stable for discharge.  - Continue home mirtazapine 7.5mg at bedtime     #Non-ischemic myocardial injury  HS trop 30 to 29. Lower concern for ACS with infectious sources to explain clinical changes.     #CKD 3a  Cr 0.98 on admission, baseline 0.7-0.9.     #Remote history of colon cancer s/p resection     #Code status  Per ED provider who spoke to EMS and saw patient's arrival papers from [facility], the patient is DNR/DNI.   - DNR/DNI          Diet: Combination Diet Regular Diet Adult    DVT Prophylaxis: Enoxaparin (Lovenox)  "SQ  Carrasco Catheter: Not present  Lines: None     Cardiac Monitoring: None  Code Status: No CPR- Do NOT Intubate      Clinically Significant Risk Factors        # Hypokalemia: Lowest K = 3.3 mmol/L in last 2 days, will replace as needed      # Hypomagnesemia: Lowest Mg = 1.5 mg/dL in last 2 days, will replace as needed   # Hypoalbuminemia: Lowest albumin = 2.7 g/dL at 12/2/2024  6:17 AM, will monitor as appropriate     # Hypertension: Noted on problem list            # Cachexia: Estimated body mass index is 17.01 kg/m  as calculated from the following:    Height as of this encounter: 1.575 m (5' 2\").    Weight as of this encounter: 42.2 kg (93 lb)., PRESENT ON ADMISSION       # Financial/Environmental Concerns: none         Social Drivers of Health            Disposition Plan     Medically Ready for Discharge: 1-2 days         Gabi Simpson MD  Hospitalist Service  [facility]  Securely message with [employer] (more info)  Text page via [employer] Paging/Directory   ______________________________________________________________________    Interval History   .  Patient is new to me. Chart reviewed and events noted.  Patient seen and examined.   In bed, no distress, awake, alert, not answering appropriately to asked questions      Physical Exam   Vital Signs: Temp: 97.4  F (36.3  C) Temp src: Axillary BP: (!) 169/72 Pulse: 72   Resp: 18 SpO2: 98 % O2 Device: None (Room air) Oxygen Delivery: 2 LPM  Weight: 93 lbs 0 oz    GEN: thin, frail, elderly female in NAD  HEENT: dry mucus membranes, anicteric sclerae  CV: RRR  RESP: CTA  Neuro:Awake,alert, confused      Medical Decision Making       35 MINUTES SPENT BY ME on the date of service doing chart review, history, exam, documentation & further activities per the note.      Data   ------------------------- PAST 24 HR DATA REVIEWED -----------------------------------------------    I have personally reviewed the following data over the past 24 hrs:    3.8 " (L)  \   11.4 (L)   / 221     135 98 17.4 /  89   3.3 (L) 28 0.58 \     ALT: 10 AST: 23 AP: 55 TBILI: 0.3   ALB: 2.7 (L) TOT PROTEIN: 5.2 (L) LIPASE: N/A       Imaging results reviewed over the past 24 hrs:   No results found for this or any previous visit (from the past 24 hours).

## 2024-12-02 NOTE — PLAN OF CARE
NURSING PROGRESS NOTE  Shift Summary      Date: December 1, 2024     Neuro/Musculoskeletal:  A&O to self  Cardiac:  VSS.     Respiratory:  Sating in the 90s on 2L nc.  :  Adequate urine output.    Diet/Appetite:  Tolerating Regular diet.  Activity:  Assist of 2   Pain:  Denies.   Skin:  No new deficits noted.   LDAs + Drips/IVF:  LR @ 75  Protocols/Labs:  Potassium and Magnesium > morning draw    Pertinent Shift Updates:  Refused Mirtazapine other calm and cooperative with cares. Repositioned Q2h. Denies pain. Consistently taking off nasal cannula, sating at 94 on RA, destats when head of bed lowered. Remains on special precautions. Alert and oriented to self, VSS. Continue to monitor.          Marlen Villarreal RN  ....................................................     Problem: Adult Inpatient Plan of Care  Goal: Optimal Comfort and Wellbeing  Outcome: Progressing     Problem: Malnutrition  Goal: Improved Nutritional Intake  Outcome: Progressing     Problem: Adult Inpatient Plan of Care  Goal: Absence of Hospital-Acquired Illness or Injury  Intervention: Identify and Manage Fall Risk  Recent Flowsheet Documentation  Taken 12/1/2024 1700 by Marlen Villarreal RN  Safety Promotion/Fall Prevention:   activity supervised   assistive device/personal items within reach   clutter free environment maintained   lighting adjusted   nonskid shoes/slippers when out of bed   patient and family education   room near nurse's station   safety round/check completed  Intervention: Prevent Skin Injury  Recent Flowsheet Documentation  Taken 12/1/2024 1911 by Marlen Villarreal RN  Body Position:   turned   left   heels elevated  Taken 12/1/2024 1711 by Marlen Villarreal RN  Body Position:   turned   right   heels elevated  Intervention: Prevent Infection  Recent Flowsheet Documentation  Taken 12/1/2024 1700 by Marlen Villarreal RN  Infection Prevention:   single patient room provided   rest/sleep promoted     Problem:  Infection  Goal: Absence of Infection Signs and Symptoms  Intervention: Prevent or Manage Infection  Recent Flowsheet Documentation  Taken 12/1/2024 1700 by Marlen Villarreal RN  Isolation Precautions: special precautions maintained   Goal Outcome Evaluation:

## 2024-12-02 NOTE — PLAN OF CARE
Problem: Malnutrition  Goal: Improved Nutritional Intake  Outcome: Progressing     Problem: Infection  Goal: Absence of Infection Signs and Symptoms  Outcome: Progressing  Intervention: Prevent or Manage Infection  Recent Flowsheet Documentation  Taken 12/2/2024 5877 by Kat Borja RN  Isolation Precautions: special precautions maintained   Goal Outcome Evaluation:         Unable to assess orientation, not responding to questions. Illogical speech.  No visible signs of pain nor discomfort.    Weaned off O2. O2 SATs wdl 95-97% in RA. Continues to have dry congested cough.    Refused most po intake this morning. Agreable to have some por intake on evening. Had some water and whole applesauce and then some mashed potatoes with gravy, tater totes and chicken noodle soup.    Repositioned q 2 h and as needed.   Purewick with good amount of U/O.     K protocol replaced per orders. Mg and K to be rechecked in the morning.

## 2024-12-02 NOTE — PROGRESS NOTES
Care Management Follow Up    Length of Stay (days): 4    Expected Discharge Date: 2024    Anticipated Discharge Plan: Return to Memory Care      Transportation: Anticipate Stretcher. Transportation costs discussed? No    PT Recommendations:    OT Recommendations:        Barriers to Discharge: medical stability, placement    Prior Living Situation: extended care facility (Memory Care) with facility resident    Discussed  Partnership in Safe Discharge Planning  document with patient/family: No     Handoff Completed: No, handoff not indicated or clinically appropriate    Patient/Spokesperson Updated: No    Additional Information:  Chart Reviewed. Pt is a resident at [facility]. Pt mobilizes with a walker at baseline but her cognition can sometimes limit her ability to mobilize. Hopefully can return to [facility] once medically ready for discharge.     3:05 PM  SW called and left a voicemail with Pedro Cherry requesting a return phone call back to discuss discharge planning and coordination. Left CM callback [phone]. MD updated.     Contacts:  Pedro at [facility]  Jo Ann, Director of [phone]     Next Steps: CM to follow up with [facility] regarding discharge planning and coordination.     DILCIA Morton

## 2024-12-03 LAB
HOLD SPECIMEN: NORMAL
HOLD SPECIMEN: NORMAL
MAGNESIUM SERPL-MCNC: 1.4 MG/DL (ref 1.7–2.3)
MAGNESIUM SERPL-MCNC: 1.9 MG/DL (ref 1.7–2.3)
MAGNESIUM SERPL-MCNC: 2 MG/DL (ref 1.7–2.3)
POTASSIUM SERPL-SCNC: 3.2 MMOL/L (ref 3.4–5.3)
POTASSIUM SERPL-SCNC: 3.9 MMOL/L (ref 3.4–5.3)

## 2024-12-03 PROCEDURE — 250N000011 HC RX IP 250 OP 636: Performed by: STUDENT IN AN ORGANIZED HEALTH CARE EDUCATION/TRAINING PROGRAM

## 2024-12-03 PROCEDURE — 250N000011 HC RX IP 250 OP 636: Performed by: HOSPITALIST

## 2024-12-03 PROCEDURE — 99232 SBSQ HOSP IP/OBS MODERATE 35: CPT | Performed by: HOSPITALIST

## 2024-12-03 PROCEDURE — 120N000001 HC R&B MED SURG/OB

## 2024-12-03 PROCEDURE — 84132 ASSAY OF SERUM POTASSIUM: CPT | Performed by: HOSPITALIST

## 2024-12-03 PROCEDURE — 36415 COLL VENOUS BLD VENIPUNCTURE: CPT | Performed by: HOSPITALIST

## 2024-12-03 PROCEDURE — 258N000003 HC RX IP 258 OP 636: Performed by: STUDENT IN AN ORGANIZED HEALTH CARE EDUCATION/TRAINING PROGRAM

## 2024-12-03 PROCEDURE — 83735 ASSAY OF MAGNESIUM: CPT | Performed by: HOSPITALIST

## 2024-12-03 RX ORDER — POTASSIUM CHLORIDE 7.45 MG/ML
10 INJECTION INTRAVENOUS
Status: COMPLETED | OUTPATIENT
Start: 2024-12-03 | End: 2024-12-03

## 2024-12-03 RX ORDER — MAGNESIUM SULFATE HEPTAHYDRATE 40 MG/ML
2 INJECTION, SOLUTION INTRAVENOUS ONCE
Status: DISCONTINUED | OUTPATIENT
Start: 2024-12-03 | End: 2024-12-03

## 2024-12-03 RX ORDER — MAGNESIUM SULFATE HEPTAHYDRATE 40 MG/ML
2 INJECTION, SOLUTION INTRAVENOUS ONCE
Status: COMPLETED | OUTPATIENT
Start: 2024-12-03 | End: 2024-12-03

## 2024-12-03 RX ADMIN — POTASSIUM CHLORIDE 10 MEQ: 7.46 INJECTION, SOLUTION INTRAVENOUS at 09:34

## 2024-12-03 RX ADMIN — SODIUM CHLORIDE, POTASSIUM CHLORIDE, SODIUM LACTATE AND CALCIUM CHLORIDE: 600; 310; 30; 20 INJECTION, SOLUTION INTRAVENOUS at 01:18

## 2024-12-03 RX ADMIN — CEFEPIME HYDROCHLORIDE 1 G: 1 INJECTION, POWDER, FOR SOLUTION INTRAMUSCULAR; INTRAVENOUS at 12:19

## 2024-12-03 RX ADMIN — POTASSIUM CHLORIDE 10 MEQ: 7.46 INJECTION, SOLUTION INTRAVENOUS at 09:29

## 2024-12-03 RX ADMIN — MAGNESIUM SULFATE HEPTAHYDRATE 2 G: 40 INJECTION, SOLUTION INTRAVENOUS at 09:29

## 2024-12-03 RX ADMIN — ENOXAPARIN SODIUM 30 MG: 30 INJECTION SUBCUTANEOUS at 09:35

## 2024-12-03 RX ADMIN — SODIUM CHLORIDE, POTASSIUM CHLORIDE, SODIUM LACTATE AND CALCIUM CHLORIDE: 600; 310; 30; 20 INJECTION, SOLUTION INTRAVENOUS at 17:42

## 2024-12-03 ASSESSMENT — ACTIVITIES OF DAILY LIVING (ADL)
ADLS_ACUITY_SCORE: 87
ADLS_ACUITY_SCORE: 87
ADLS_ACUITY_SCORE: 91
ADLS_ACUITY_SCORE: 91
ADLS_ACUITY_SCORE: 87
ADLS_ACUITY_SCORE: 91
ADLS_ACUITY_SCORE: 87
ADLS_ACUITY_SCORE: 91
ADLS_ACUITY_SCORE: 87
ADLS_ACUITY_SCORE: 86
ADLS_ACUITY_SCORE: 87
ADLS_ACUITY_SCORE: 91
ADLS_ACUITY_SCORE: 86
ADLS_ACUITY_SCORE: 87
ADLS_ACUITY_SCORE: 87
ADLS_ACUITY_SCORE: 91
ADLS_ACUITY_SCORE: 86
ADLS_ACUITY_SCORE: 91
ADLS_ACUITY_SCORE: 87

## 2024-12-03 NOTE — CONSULTS
Care Management Follow Up    Length of Stay (days): 5    Expected Discharge Date: 12/04/2024    Anticipated Discharge Plan:  Assisted Living/Memory Care with Hospice    Transportation: Anticipate Stretcher. Transportation costs discussed? No    PT Recommendations:    OT Recommendations:        Barriers to Discharge: placement    Prior Living Situation: extended care facility (Memory Care) with facility resident    Discussed  Partnership in Safe Discharge Planning  document with patient/family: No     Handoff Completed: No, handoff not indicated or clinically appropriate    Patient/Spokesperson Updated: Yes. Who? Step-Son Deepak    Additional Information:  9:03 AM  SW called and left a voicemail with [facility] TIFFANIE Cherry requesting a return phone call back to discuss discharge planning and coordination. Left CM callback [phone]. OPAL called the Main Office contact at [facility] and spoke to Scottie who reported that Jo Ann has been very busy lately and has been trying to call people back as promptly as she is able. Will reported that he will text Jo Ann to let her know that KIMBERLEE called and is requesting a return phone call back to discuss discharge planning.     10:13 AM  SW received a phone call back from Jo Ann who reported that Pt would need to be no more than Ax1 for mobility but understands that Pt's mobility can waiver based on her cognition that day. Jo Ann asked for updated notes and for the bedside RN to contact her since Pt did not have PT/OT consults completed. Jo Ann will call CM back once able to review notes and discuss with nursing.    OPAL asked bedside RN to contact Jo Ann for RN-to-RN report for Pt's return. OPAL provided RN with a Sticky Note with Jo Ann's contact.    1:14 PM  KIMBERLEE spoke with the MD who reported that Pt's step-son Deepak was open to considering hospice services at discharge.     2:19 PM  OPAL called and spoke to TIFFANIE Cherry regarding Pt potentially returning to [facility] with hospice  services. Jo Ann reported that they can work with any agency but have a good relationship with [facility]. Jo Ann reported that Denis Espinosa is the [facility] liaison assigned to their facility. OPAL reported that CM will discuss hospice further with Pt's step-son Deepak and call with updates tomorrow.     OPAL called and spoke with Deepak regarding hospice services. Deepak reported that his goal is to be able to have Pt return to [facility] with hospice. OPAL discussed options for hospice and offered to send a list of hospice agencies in the area for him to choose from. OPAL noted that [facility] most commonly works with [facility]. Deepak reported that he would be agreeable with [facility] as he would most want Pt to return to [facility] with the best care possible. OPAL offered Deepak the contact for Denis at [facility] and reported CM can send a referral to [facility] for their team to complete a clinical review. Deepak was agreeable with the plan and plans to connect with Jo Ann at [facility] to discuss further plans for Pt to return to memory care with hospice services.      Referral sent to [facility].     Contacts:  Pedro at [facility]  Main Office: [phone]  Jo Ann, Director of Nursing:   Phone: [phone]  Fax: [phone]  Denis Espinosa, [facility] Liaison for [facility]: [phone]      Next Steps: CM to follow up with family, [facility], and hospice to coordinate final plans for Pt's discharge.      DILCIA Morton

## 2024-12-03 NOTE — PROGRESS NOTES
[facility]    Medicine Progress Note - Hospitalist Service    Date of Admission:  11/28/2024    Assessment & Plan   Ana Kumar is a 89 year old female with PMHx of advanced dementia residing in memory care, remote history of colon cancer s/p resection who presents with weakness, decreased responsivness, and concern for a facial droop at her memory care. Found to be COVID positive, and a UTI. Pt has been having poor intake. Not sure how she will be able to return to her memory care unit. D/w step son today about Hospice and he is open to this.      #Acute weakness and lethargy  #UTI  The patient cannot provide a history due to dementia. Reportedly 1 day of these symptoms at her memory care, but the available history is limited. CT head with no acute changes to brain but noted signs of sinusitis.  Found to be COVID-19 positive on nasal swab which could be driving her functional decline. UA also s/o UTI.   - Treating COVID as below  - UA s/o UTI, cx + for enterobacter complex, IV Zosyn switched to cefepime to Rx UTI and sinusitis.   - IVF as pt has poor PO intake     #COVID-19 upper respiratory infection  #Acute sinusitis  Not hypoxemic on admission but intermittently needing o2 by NC.   High likelihood for superimposed bacterial infection with sinusitis   -Tested positive on 11/28  - Competed Remdesivir x3 days (considered Paxlovid but patient refused PO medications on admission)  - Cefepime for sinusitis and UTI.     #Major neurocognitive disorder, Delirium  Resides in memory care, would be appropriate to return there when stable for discharge.  - Continue home mirtazapine 7.5mg at bedtime  - Pt has been having poor Po intake, on IVF.   - D/w with her step son, he is open to Hospice and this was communicated to SW     #Non-ischemic myocardial injury  HS trop 30 to 29. Lower concern for ACS with infectious sources to explain clinical changes.     #CKD 3a  Cr 0.98 on admission, baseline  "0.7-0.9.     #Remote history of colon cancer s/p resection    #Hypok/HypoMg - replete prn     #Code status  Per ED provider who spoke to EMS and saw patient's arrival papers from [facility], the patient is DNR/DNI.   - DNR/DNI          Diet: Combination Diet Regular Diet Adult    DVT Prophylaxis: Enoxaparin (Lovenox) SQ  Carrasco Catheter: Not present  Lines: None     Cardiac Monitoring: None  Code Status: No CPR- Do NOT Intubate      Clinically Significant Risk Factors        # Hypokalemia: Lowest K = 3.2 mmol/L in last 2 days, will replace as needed      # Hypomagnesemia: Lowest Mg = 1.4 mg/dL in last 2 days, will replace as needed   # Hypoalbuminemia: Lowest albumin = 2.7 g/dL at 12/2/2024  6:17 AM, will monitor as appropriate     # Hypertension: Noted on problem list            # Cachexia: Estimated body mass index is 17.01 kg/m  as calculated from the following:    Height as of this encounter: 1.575 m (5' 2\").    Weight as of this encounter: 42.2 kg (93 lb).        # Financial/Environmental Concerns: none         Social Drivers of Health            Disposition Plan     Medically Ready for Discharge: 1-2 days         Gabi Simpson MD  Hospitalist Service  [facility]  Securely message with [employer] (more info)  Text page via [facility] Paging/Directory   ______________________________________________________________________    Interval History    Chart reviewed and events noted.  Patient seen and examined.   In bed, no distress, awake, alert, speaks nonsensical. Not eating per RN.      Physical Exam   Vital Signs: Temp: 98.1  F (36.7  C) Temp src: Axillary BP: (!) 169/68 Pulse: 82   Resp: 21 SpO2: 94 % O2 Device: None (Room air)    Weight: 93 lbs 0 oz    GEN: thin, frail, elderly female in NAD  CV: RRR  RESP: CTA  Neuro:Awake,alert, confused      Medical Decision Making       >45 MINUTES SPENT BY ME on the date of service doing chart review, history, exam, documentation & further activities per " the note.      Plan d/w her step son over the phone, bedside RN, SW/CM    Data   ------------------------- PAST 24 HR DATA REVIEWED -----------------------------------------------    I have personally reviewed the following data over the past 24 hrs:    N/A  \   N/A   / N/A     N/A N/A N/A /  N/A   3.2 (L) N/A N/A \       Imaging results reviewed over the past 24 hrs:   No results found for this or any previous visit (from the past 24 hours).

## 2024-12-03 NOTE — PLAN OF CARE
Problem: Adult Inpatient Plan of Care  Goal: Optimal Comfort and Wellbeing  Outcome: Progressing     Problem: Infection  Goal: Absence of Infection Signs and Symptoms  Outcome: Progressing  Intervention: Prevent or Manage Infection  Recent Flowsheet Documentation  Taken 12/2/2024 2300 by Lisa Rodrgiuez RN  Isolation Precautions: special precautions maintained     Problem: Pain Acute  Goal: Optimal Pain Control and Function  Outcome: Progressing  Intervention: Prevent or Manage Pain  Recent Flowsheet Documentation  Taken 12/2/2024 2300 by Lisa Rodriguez RN  Medication Review/Management: medications reviewed   Goal Outcome Evaluation:         Pt is alert to self, with confusion. Denies pain/no pain noted. Reposition q2hr, VSS. Special Precaution maintained, continue to monitor.Lisa Rodriguez RN .

## 2024-12-04 LAB
CREAT SERPL-MCNC: 0.56 MG/DL (ref 0.51–0.95)
EGFRCR SERPLBLD CKD-EPI 2021: 83 ML/MIN/1.73M2
MAGNESIUM SERPL-MCNC: 1.7 MG/DL (ref 1.7–2.3)
PLATELET # BLD AUTO: 252 10E3/UL (ref 150–450)
POTASSIUM SERPL-SCNC: 3.7 MMOL/L (ref 3.4–5.3)

## 2024-12-04 PROCEDURE — 85049 AUTOMATED PLATELET COUNT: CPT | Performed by: STUDENT IN AN ORGANIZED HEALTH CARE EDUCATION/TRAINING PROGRAM

## 2024-12-04 PROCEDURE — 82565 ASSAY OF CREATININE: CPT | Performed by: HOSPITALIST

## 2024-12-04 PROCEDURE — 258N000003 HC RX IP 258 OP 636: Performed by: STUDENT IN AN ORGANIZED HEALTH CARE EDUCATION/TRAINING PROGRAM

## 2024-12-04 PROCEDURE — 250N000011 HC RX IP 250 OP 636: Performed by: STUDENT IN AN ORGANIZED HEALTH CARE EDUCATION/TRAINING PROGRAM

## 2024-12-04 PROCEDURE — 36415 COLL VENOUS BLD VENIPUNCTURE: CPT | Performed by: HOSPITALIST

## 2024-12-04 PROCEDURE — 84132 ASSAY OF SERUM POTASSIUM: CPT | Performed by: HOSPITALIST

## 2024-12-04 PROCEDURE — 99232 SBSQ HOSP IP/OBS MODERATE 35: CPT | Performed by: STUDENT IN AN ORGANIZED HEALTH CARE EDUCATION/TRAINING PROGRAM

## 2024-12-04 PROCEDURE — 83735 ASSAY OF MAGNESIUM: CPT | Performed by: HOSPITALIST

## 2024-12-04 PROCEDURE — 250N000013 HC RX MED GY IP 250 OP 250 PS 637: Performed by: STUDENT IN AN ORGANIZED HEALTH CARE EDUCATION/TRAINING PROGRAM

## 2024-12-04 PROCEDURE — 120N000001 HC R&B MED SURG/OB

## 2024-12-04 RX ORDER — BISACODYL 10 MG
10 SUPPOSITORY, RECTAL RECTAL DAILY PRN
Status: DISCONTINUED | OUTPATIENT
Start: 2024-12-04 | End: 2024-12-05 | Stop reason: HOSPADM

## 2024-12-04 RX ORDER — HYDRALAZINE HYDROCHLORIDE 20 MG/ML
10 INJECTION INTRAMUSCULAR; INTRAVENOUS EVERY 6 HOURS PRN
Status: DISCONTINUED | OUTPATIENT
Start: 2024-12-04 | End: 2024-12-05 | Stop reason: HOSPADM

## 2024-12-04 RX ORDER — CEFEPIME HYDROCHLORIDE 1 G/1
1 INJECTION, POWDER, FOR SOLUTION INTRAMUSCULAR; INTRAVENOUS EVERY 24 HOURS
Status: DISCONTINUED | OUTPATIENT
Start: 2024-12-05 | End: 2024-12-05 | Stop reason: HOSPADM

## 2024-12-04 RX ADMIN — SODIUM CHLORIDE, POTASSIUM CHLORIDE, SODIUM LACTATE AND CALCIUM CHLORIDE: 600; 310; 30; 20 INJECTION, SOLUTION INTRAVENOUS at 06:25

## 2024-12-04 RX ADMIN — ENOXAPARIN SODIUM 30 MG: 30 INJECTION SUBCUTANEOUS at 10:13

## 2024-12-04 RX ADMIN — BISACODYL 10 MG: 10 SUPPOSITORY RECTAL at 17:29

## 2024-12-04 RX ADMIN — CEFEPIME HYDROCHLORIDE 1 G: 1 INJECTION, POWDER, FOR SOLUTION INTRAMUSCULAR; INTRAVENOUS at 12:55

## 2024-12-04 ASSESSMENT — ACTIVITIES OF DAILY LIVING (ADL)
ADLS_ACUITY_SCORE: 86
COMMUNICATION: DIFFICULTY UNDERSTANDING
ADLS_ACUITY_SCORE: 85
ADLS_ACUITY_SCORE: 86
ADLS_ACUITY_SCORE: 88
ADLS_ACUITY_SCORE: 85
ADLS_ACUITY_SCORE: 85
ADLS_ACUITY_SCORE: 86
FALL_HISTORY_WITHIN_LAST_SIX_MONTHS: NO
ADLS_ACUITY_SCORE: 91
ADLS_ACUITY_SCORE: 86
ADLS_ACUITY_SCORE: 90
ADLS_ACUITY_SCORE: 86

## 2024-12-04 NOTE — PROGRESS NOTES
Care Management Follow Up    Length of Stay (days): 6    Expected Discharge Date: 12/05/2024    Anticipated Discharge Plan:  Assisted Living, Hospice    Transportation: Confirmed Stretcher. Transportation costs discussed? Yes. Discussed with step-son Deepak    PT Recommendations:    OT Recommendations:        Barriers to Discharge: placement    Prior Living Situation: extended care facility (Memory Care) with facility resident    Discussed  Partnership in Safe Discharge Planning  document with patient/family: No     Handoff Completed: No, handoff not indicated or clinically appropriate    Patient/Spokesperson Updated: Yes. Who? Step-denice Sotelo    Additional Information:  Plan is to return to [facility] with hospice services. Referral had been sent to [facility] yesterday (12/3).    9:23 AM  OPAL received a phone call from Denis with [facility] to discuss discharge planning for Pt's return to [facility] with hospice services. Denis reported that they have availability to sign Pt onto services tomorrow at any time but would not be able to do so today. Denis reported that he spoke to Pt's step-son Deepak yesterday and plans to meet with him later today to complete a consultation. OPAL mentioned that CM will contact TIFFANIE Cherry at [facility] to confirm a plan for tomorrow but noted that CM would hope to plan for a morning discharge. Denis agreed with the plan for Pt's discharge for tomorrow morning. OPAL confirmed with bother Pedro Cherry and Pt's step-son Deepak that they would be able to plan for Pt to discharge back to memory care services with hospice tomorrow morning around 1100. Deepak was understanding and agreeable with the time and potential private costs for transport.    [facility] stretcher transport set up for tomorrow (12/5) with a pick-up window between 1036-1164.     Contacts:  [facility]  Main Office: [phone]  Jo Ann, Director of  Nursing:   Phone: [phone]  Fax: [phone]  Sindy Larsen Hospice Liaison for [facility]: [phone]    Next Steps: Coordinate final plans and needs for Pt's discharge in the morning. PCS needed.      DILCIA Morton

## 2024-12-04 NOTE — PLAN OF CARE
Problem: Malnutrition  Goal: Improved Nutritional Intake  Outcome: Progressing     Problem: Adult Inpatient Plan of Care  Goal: Optimal Comfort and Wellbeing  Outcome: Progressing   Goal Outcome Evaluation:         Unable to assess orientation, not responding to questions. Illogical speech.  No visible signs of pain nor discomfort.    O2 SATs wdl in RA. Dry congested cough.    Refused most po intake this morning. Refused morning meds as well. Had some lunch (applesauce and mandarin oranges) for lunch and a few sips of water. Refused dinner.    Repositioned q 2 h and as needed. Purewick with good amount of UO.     K and Mg replaced per orders. Mg and K to be rechecked in the morning.

## 2024-12-04 NOTE — PLAN OF CARE
"  Problem: Pain Acute  Goal: Optimal Pain Control and Function  Outcome: Progressing     Problem: Malnutrition  Goal: Improved Nutritional Intake  Outcome: Progressing     Problem: Infection  Goal: Absence of Infection Signs and Symptoms  Outcome: Progressing    Patient alert, but confused. VSS. No signs of pain. Patient refused evening meds and sips of fluids on shift. Resting in between cares. Patient frequently stating \"I'm dying I'm dying.\" Using purewick. BM on shift. Repo 2 hours. Patient expresses no further concerns at this time. Nursing continue to monitor.     Cris Rogers RN  [phone]  "

## 2024-12-04 NOTE — PROGRESS NOTES
[facility]    Medicine Progress Note - Hospitalist Service    Date of Admission:  11/28/2024    Assessment & Plan   Ana Kumar is a 89 year old female with PMHx of advanced dementia residing in memory care, remote history of colon cancer s/p resection who presents with weakness, decreased responsivness, and concern for a facial droop at her [facility] care. Found to be COVID positive, and a UTI. Pt has been having poor intake here.     #Acute weakness and lethargy  #UTI  The patient cannot provide a history due to dementia. Reportedly 1 day of these symptoms at her [facility] care, but the available history is limited. CT head with no acute changes to brain but noted signs of sinusitis.  Found to be COVID-19 positive on nasal swab which could be driving her functional decline. UA also s/o UTI.   - Treating COVID as below  - UA c/w UTI, cx + for highly resistant enterobacter complex  - IV Zosyn switched to cefepime on 12/1 to treat UTI and sinusitis, will plan to give dose on 12/5 early so she receives a 5 day course of antibiotics  - Patient has continued to have very poor PO intake despite awake and alert mentation, says she doesn't feel hungry, also declines taking any oral medications. The patient may be near the end of her life and I think hospice is appropriate. Dr. Simpson spoke with the patient's step-son on 12/3 and he agreed. Plan to return to patient's memory care 12/5 with home hospice.    #COVID-19 upper respiratory infection  #Acute sinusitis  Not hypoxemic on admission but intermittently needing o2 by NC. High likelihood for superimposed bacterial infection with sinusitis   - Tested positive on 11/28  - Competed Remdesivir x3 days (considered Paxlovid but patient refused PO medications on admission)  - Cefepime for sinusitis and UTI as above     #Major neurocognitive disorder, Delirium  Resides in memory care, would be appropriate to return there when stable for discharge.  -  "Continue home mirtazapine 7.5mg at bedtime  - Pt has been having poor Po intake, on IVF, discontinue at discharge  - Memory care with home hospice as above     #Non-ischemic myocardial injury  HS trop 30 to 29. Lower concern for ACS with infectious sources to explain clinical changes.     #JASON, resolved  Cr 0.98 on admission, now down to 0.6. Likely dehydrated from poor PO intake.     #Remote history of colon cancer s/p resection    #Hypok/HypoMg - replete prn     #Code status  Per ED provider who spoke to EMS and saw patient's arrival papers from [facility] care, the patient is DNR/DNI.   - DNR/DNI          Diet: Combination Diet Regular Diet Adult    DVT Prophylaxis: Enoxaparin (Lovenox) SQ  Carrasco Catheter: Not present  Lines: None     Cardiac Monitoring: None  Code Status: No CPR- Do NOT Intubate      Clinically Significant Risk Factors        # Hypokalemia: Lowest K = 3.2 mmol/L in last 2 days, will replace as needed      # Hypomagnesemia: Lowest Mg = 1.4 mg/dL in last 2 days, will replace as needed   # Hypoalbuminemia: Lowest albumin = 2.7 g/dL at 12/2/2024  6:17 AM, will monitor as appropriate     # Hypertension: Noted on problem list            # Cachexia: Estimated body mass index is 17.01 kg/m  as calculated from the following:    Height as of this encounter: 1.575 m (5' 2\").    Weight as of this encounter: 42.2 kg (93 lb).        # Financial/Environmental Concerns: none         Social Drivers of Health            Disposition Plan     Medically Ready for Discharge: Tomorrow         ELSIE KAN MD  Hospitalist Service  [facility]  Securely message with [employer] (more info)  Text page via [facility] Paging/Directory   ______________________________________________________________________    Interval History .   In bed, no distress, awake, alert, speaks nonsensical. Not eating per RN.      Physical Exam   Vital Signs: Temp: 97.6  F (36.4  C) Temp src: Axillary BP: (!) 147/5 Pulse: 92   " Resp: 20 SpO2: 96 % O2 Device: None (Room air)    Weight: 93 lbs 0 oz    GEN: thin, frail, elderly female in NAD  CV: RRR  RESP: CTA  Neuro:Awake,alert, confused      Medical Decision Making       40 MINUTES SPENT BY ME on the date of service doing chart review, history, exam, documentation & further activities per the note.        Data   ------------------------- PAST 24 HR DATA REVIEWED -----------------------------------------------    I have personally reviewed the following data over the past 24 hrs:    N/A  \   N/A   / 252     N/A N/A N/A /  N/A   3.7 N/A 0.56 \       Imaging results reviewed over the past 24 hrs:   No results found for this or any previous visit (from the past 24 hours).

## 2024-12-05 VITALS
SYSTOLIC BLOOD PRESSURE: 114 MMHG | HEIGHT: 62 IN | BODY MASS INDEX: 17.11 KG/M2 | DIASTOLIC BLOOD PRESSURE: 80 MMHG | RESPIRATION RATE: 20 BRPM | WEIGHT: 93 LBS | TEMPERATURE: 97.3 F | HEART RATE: 88 BPM | OXYGEN SATURATION: 95 %

## 2024-12-05 PROCEDURE — 250N000011 HC RX IP 250 OP 636: Performed by: STUDENT IN AN ORGANIZED HEALTH CARE EDUCATION/TRAINING PROGRAM

## 2024-12-05 PROCEDURE — 99239 HOSP IP/OBS DSCHRG MGMT >30: CPT | Performed by: STUDENT IN AN ORGANIZED HEALTH CARE EDUCATION/TRAINING PROGRAM

## 2024-12-05 RX ADMIN — CEFEPIME HYDROCHLORIDE 1 G: 1 INJECTION, POWDER, FOR SOLUTION INTRAMUSCULAR; INTRAVENOUS at 08:11

## 2024-12-05 ASSESSMENT — ACTIVITIES OF DAILY LIVING (ADL)
ADLS_ACUITY_SCORE: 91
ADLS_ACUITY_SCORE: 91
ADLS_ACUITY_SCORE: 92
ADLS_ACUITY_SCORE: 93
ADLS_ACUITY_SCORE: 92
ADLS_ACUITY_SCORE: 91
ADLS_ACUITY_SCORE: 93
ADLS_ACUITY_SCORE: 91
ADLS_ACUITY_SCORE: 91
ADLS_ACUITY_SCORE: 92
ADLS_ACUITY_SCORE: 89
ADLS_ACUITY_SCORE: 91

## 2024-12-05 NOTE — DISCHARGE SUMMARY
"[facility]  Hospitalist Discharge Summary      Date of Admission:  11/28/2024  Date of Discharge:  12/5/2024 12:16 PM  Discharging Provider: ELSIE KAN MD  Discharge Service: Hospitalist Service    Discharge Diagnoses     #Acute weakness and lethargy  #UTI  #COVID-19 upper respiratory infection  #Acute sinusitis  #Major neurocognitive disorder  #Non-ischemic myocardial injury   #JASON, resolved   #Remote history of colon cancer s/p resection   #Hypokalemia  #Hypomagesemia       Clinically Significant Risk Factors     # Cachexia: Estimated body mass index is 17.01 kg/m  as calculated from the following:    Height as of this encounter: 1.575 m (5' 2\").    Weight as of this encounter: 42.2 kg (93 lb).       Follow-ups Needed After Discharge   Follow-up Appointments       Follow Up and recommended labs and tests      Follow up with Nursing home physician and home hospice agency staff.  No follow up labs or test are needed.                Unresulted Labs Ordered in the Past 30 Days of this Admission       No orders found from 10/29/2024 to 11/29/2024.            Discharge Disposition   Discharged to long-term care facility  Condition at discharge: Stable    Hospital Course   Ana Kumar is a 89 year old female with PMHx of advanced dementia residing in [facility] care, remote history of colon cancer s/p resection who presents with weakness, decreased responsivness, and concern for a facial droop at her memory care. Found to be COVID positive, and a UTI. Pt has been having poor intake here.     #Discharge to home hospice  #Acute weakness and lethargy  #UTI  The patient cannot provide a history due to dementia. Reportedly 1 day of these symptoms at her memory care, but the available history is limited. CT head with no acute changes to brain but noted signs of sinusitis.  Found to be COVID-19 positive on nasal swab which could be driving her functional decline. UA also s/o UTI. Treated UTI " with 5 days of cefepime and IVF. Patient never really clinically improved and during the entire admission refused to eat or drink, and declined all oral medications despite being awake and alert. This is consistent with advanced dementia and I believe the patient is nearing the end of her life. Dr. Simpson spoke with the patient's step-son on 12/3 and discussed her recommendation to transition to home hospice, and he agreed. The patient continues to be weak here with poor PO intake. I suspect she has maybe a week or so to live at the time of discharge if her oral intake doesn't improve.  - UA c/w UTI, cx + for highly resistant enterobacter complex  - IV Zosyn switched to cefepime on 12/1 to treat UTI and sinusitis, will plan to give dose on 12/5 early so she receives a 5 day course of antibiotics    #COVID-19 upper respiratory infection  #Acute sinusitis  Not hypoxemic on admission. High likelihood for superimposed bacterial infection with sinusitis   - Tested positive on 11/28  - Competed Remdesivir x3 days (considered Paxlovid but patient refused PO medications on admission)  - Cefepime for sinusitis and UTI as above     #Major neurocognitive disorder, Delirium  Resides in memory care, would be appropriate to return there when stable for discharge.  - Continue home mirtazapine 7.5mg at bedtime  - Memory care with home hospice as above     #Non-ischemic myocardial injury  HS trop 30 to 29. Lower concern for ACS with infectious sources to explain clinical changes.     #JASON, resolved  Cr 0.98 on admission, now down to 0.6. Likely dehydrated from poor PO intake.     #Remote history of colon cancer s/p resection    #Hypok/HypoMg - replete prn     #Code status  Per ED provider who spoke to EMS and saw patient's arrival papers from [facility], the patient is DNR/DNI.   - DNR/DNI    Consultations This Hospital Stay   CARE MANAGEMENT / SOCIAL WORK IP CONSULT  CARE MANAGEMENT / SOCIAL WORK IP CONSULT    Code Status   No  CPR- Do NOT Intubate    Time Spent on this Encounter   I, ELSIE KAN MD, personally saw the patient today and spent greater than 30 minutes discharging this patient.       ELSIE KAN MD  [address] [address]  Phone: [phone]  Fax: [phone]  ______________________________________________________________________    Physical Exam   Vital Signs: Temp: 97.3  F (36.3  C) Temp src: Axillary BP: 114/80 Pulse: 88   Resp: 20 SpO2: 95 % O2 Device: None (Room air)    Weight: 93 lbs 0 oz    GEN: thin, frail, elderly female in NAD  CV: RRR  RESP: CTA  Neuro:Awake,alert, confused       Primary Care Physician   Elsie Kaur    Discharge Orders      Hospice Referral      General info for SNF    Length of Stay Estimate: Long Term Care  Condition at Discharge: Stable  Level of care:board and care  Rehabilitation Potential: Poor  Admission H&P remains valid and up-to-date: Yes  Recent Chemotherapy: N/A  Use Nursing Home Standing Orders: Yes     Follow Up and recommended labs and tests    Follow up with Nursing home physician and home hospice agency staff.  No follow up labs or test are needed.     Reason for your hospital stay    Admitted for generalized decline and found to have a UTI and be COVID positive. The patient has been medically stable here, but is refusing to eat/drink or take any oral medications. With advanced dementia, her care was discussed with family and they elected to return to memory care with home hospice, which is appropriate.     Activity - Up with nursing assistance     Airborne Isolation    Follow your site-specific protocols for COVID isolation, may no longer warrant airborne precautions. Positive 1 week ago on 11/28/24. Has been afebrile during her admission.     Fall precautions     Diet    Follow this diet upon discharge: Current Diet:Orders Placed This Encounter      Combination Diet Regular Diet Adult        Significant Results and Procedures   Most Recent 3 CBC's:  Recent Labs   Lab Test 12/04/24  0730 12/02/24  0617 12/01/24  0629 11/29/24  0626   WBC  --  3.8* 4.4 5.3   HGB  --  11.4* 11.2* 11.4*   MCV  --  96 97 98    221 216 188     Most Recent 3 BMP's:  Recent Labs   Lab Test 12/04/24  0730 12/03/24  1614 12/03/24  0547 12/02/24  1711 12/02/24  0617 12/01/24  1606 12/01/24  0629 11/30/24  0711 11/29/24  0626   NA  --   --   --   --  135  --  143  --  143   POTASSIUM 3.7 3.9 3.2*   < > 3.3*   < > 3.3*  --  3.6   CHLORIDE  --   --   --   --  98  --  107  --  107   CO2  --   --   --   --  28  --  26  --  24   BUN  --   --   --   --  17.4  --  24.6*  --  15.5   CR 0.56  --   --   --  0.58  --  0.68   < > 0.69   ANIONGAP  --   --   --   --  9  --  10  --  12   RK  --   --   --   --  8.1*  --  8.4*  --  7.6*   GLC  --   --   --   --  89  --  91  --  94    < > = values in this interval not displayed.   ,   Results for orders placed or performed during the hospital encounter of 11/28/24   Head CT w/o contrast    Narrative    EXAM: CT HEAD W/O CONTRAST  LOCATION: [facility]  DATE: 11/28/2024    INDICATION: Confusion  COMPARISON: None.  TECHNIQUE: Routine CT Head without IV contrast. Multiplanar reformats. Dose reduction techniques were used.    FINDINGS:  INTRACRANIAL CONTENTS: No intracranial hemorrhage, extraaxial collection, or mass effect.  No CT evidence of acute infarct. Moderate presumed chronic small vessel ischemic changes. Mild to moderate generalized volume loss. No hydrocephalus.     VISUALIZED ORBITS/SINUSES/MASTOIDS: No intraorbital abnormality. Mild mucosal thickening scattered about the paranasal sinuses. Multiple small paranasal sinus air-fluid levels. Please correlate for acute sinusitis. Right mastoid air cells mild to   moderate opacification. Left mastoid air cells essentially clear.    BONES/SOFT TISSUES: No acute abnormality. Left maxillary molar partially  visualized cavity.      Impression    IMPRESSION:  1.  No acute intracranial process.    2.  Multiple small paranasal sinus air-fluid levels. Please correlate for acute sinusitis.       Discharge Medications   Current Discharge Medication List        CONTINUE these medications which have NOT CHANGED    Details   acetaminophen (TYLENOL) 500 MG tablet Take 1,000 mg by mouth 3 times daily.      calcium carbonate (TUMS) 500 MG chewable tablet Take 3 chew tab by mouth daily.      diclofenac (VOLTAREN) 1 % topical gel Apply 4 g topically 2 times daily.      !! dimethicone-zinc oxide (TING PROTECT) external cream Apply topically 4 times daily.      !! dimethicone-zinc oxide (TING PROTECT) external cream Apply topically daily as needed for dry skin or other.      Lidocaine (LIDOCARE) 4 % Patch Place 1 patch onto the skin daily as needed for moderate pain. To prevent lidocaine toxicity, patient should be patch free for 12 hrs daily.      LORazepam (ATIVAN) 0.5 MG tablet Take 0.5 mg by mouth daily as needed for sleep.      melatonin 3 MG tablet Take 9 mg by mouth at bedtime.      !! mineral oil-white petrolatum (EUCERIN/MINERIN) cream Apply topically daily.      mirtazapine (REMERON) 7.5 MG tablet Take 7.5 mg by mouth at bedtime.      nitroGLYcerin (NITROSTAT) 0.4 MG sublingual tablet Place 0.4 mg under the tongue every 5 minutes as needed for chest pain. For chest pain place 1 tablet under the tongue every 5 minutes for 3 doses. If symptoms persist 5 minutes after 1st dose call 911.      potassium chloride king ER (KLOR-CON M10) 10 MEQ CR tablet Take 10 mEq by mouth daily.      senna-docusate (SENOKOT-S/PERICOLACE) 8.6-50 MG tablet Take 2 tablets by mouth 2 times daily.      vitamin D3 (CHOLECALCIFEROL) 50 mcg (2000 units) tablet Take 1 tablet by mouth daily.      Zinc Oxide (DESITIN) 13 % CREA Externally apply topically 2 times daily.       !! - Potential duplicate medications found. Please discuss with provider.         Allergies   Allergies   Allergen Reactions    Macrobid [Nitrofurantoin]

## 2024-12-05 NOTE — PROGRESS NOTES
Care Management Discharge Note    Discharge Date: 12/05/2024       Discharge Disposition: Assisted Living, Hospice    Discharge Services:  Hospice    Discharge DME: None    Discharge Transportation: agency    Private pay costs discussed: transportation costs    Does the patient's insurance plan have a 3 day qualifying hospital stay waiver?  No    PAS Confirmation Code: N/A  Patient/family educated on Medicare website which has current facility and service quality ratings: yes    Education Provided on the Discharge Plan: Yes  Persons Notified of Discharge Plans: step-son Deepak  Patient/Family in Agreement with the Plan: yes    Handoff Referral Completed: No, handoff not indicated or clinically appropriate    Additional Information:  Plan is for Pt to discharge back to [facility] with [facility] services today. [facility] transport pick-up window: [phone].    7:39 AM  SW reached out to MD to confirm discharge plans for this morning and request discharge orders. Care Team updated. PCS completed.    8:25 AM  [facility] called and left a voicemail with Pedro Cherry to confirm discharge plans. [facility] called and spoke to [facility] Liaison Denis Espinosa who confirmed Pt's discharge plans for this morning. Denis reported that a hospice nurse will be at the facility this morning and ready to sign Pt onto services as soon as Pt arrives back at [facility]. [facility] confirmed the fax number for discharge orders. Discharge orders sent to [facility] and [facility].     Pt's step-son Deepak updated that all discharge plans have been confirmed for this morning and Pt is set to discharge back to [facility] around 1045. Deepak reported agreement with the plan and denied having any further questions for CM.     CM will sign off. Please contact CM if any additional needs arise prior to discharge.     Contacts:  Pedro at [facility]  Main Office: [phone]  Jo Ann, Director of  Nursing:   Phone: [phone]  Fax: [phone]  S[facility]ice Liaison for [facility]:   Phone: [phone]  Fax: [phone]      DILCIA Morton

## 2024-12-05 NOTE — PLAN OF CARE
Problem: Adult Inpatient Plan of Care  Goal: Optimal Comfort and Wellbeing  Outcome: Adequate for Care Transition     Problem: Adult Inpatient Plan of Care  Goal: Readiness for Transition of Care  Outcome: Adequate for Care Transition   Goal Outcome Evaluation:         Unable to assess orientation, not responding to questions appropriately. Illogical speech.   Repositioned q 2 h.   Refused po intake and po medications this morning.     Smear BM this morning.     Plan to discharge back to memory care on hospice with stretcher ride this morning.

## 2024-12-05 NOTE — PLAN OF CARE
"  Problem: Gas Exchange Impaired  Goal: Optimal Gas Exchange  Outcome: Progressing     Problem: Comorbidity Management  Goal: Blood Pressure in Desired Range  Outcome: Progressing     Problem: Palliative Care  Goal: Enhanced Quality of Life  Outcome: Progressing     Problem: Pain Acute  Goal: Optimal Pain Control and Function  Outcome: Progressing       Patient alert, but confused. VSS. No signs of pain. Patient refused evening meds and sips of fluids on shift. Resting in between cares. Patient frequently stating \"I'm dying I'm dying.\" Using purewick. Large loose BM on shift. Repo when pt allows.  Patient expresses no further concerns at this time. Nursing continue to monitor.    Cris Rogers RN  [phone]   "

## 2024-12-05 NOTE — PLAN OF CARE
"Shift from 0700 to 1930-    Problem: Infection  Goal: Absence of Infection Signs and Symptoms  Outcome: Progressing  Intervention: Prevent or Manage Infection  Recent Flowsheet Documentation  Taken 12/4/2024 0911 by Ayleen Candelario, RN  Isolation Precautions: special precautions maintained     Problem: Malnutrition  Goal: Improved Nutritional Intake  Outcome: Progressing     Goal Outcome Evaluation:    Patient slept most of the morning. Turned and rubbed every 2-4 hrs depending if she refused.    Elevated BP in am. Updated MD. Recheck WNL. IVF was Sl'd.     Refused to eat breakfast and lunch. Refusing all meds.Stated,\"I don't like that and no I don't want that.\" Attempting at this time to eat dinner.     Pt had hard stool on NOC shift. Updated MD. Daily prn suppository added to MAR.    Given suppository and had loose stool before placing suppository.     Continued on IV abx.   Pt in special precautions for COVID.                             "